# Patient Record
Sex: MALE | Race: WHITE | NOT HISPANIC OR LATINO | Employment: STUDENT | URBAN - METROPOLITAN AREA
[De-identification: names, ages, dates, MRNs, and addresses within clinical notes are randomized per-mention and may not be internally consistent; named-entity substitution may affect disease eponyms.]

---

## 2020-04-27 ENCOUNTER — ANESTHESIA (OUTPATIENT)
Dept: PERIOP | Facility: HOSPITAL | Age: 25
End: 2020-04-27

## 2020-04-27 ENCOUNTER — HOSPITAL ENCOUNTER (OUTPATIENT)
Facility: HOSPITAL | Age: 25
Discharge: HOME | End: 2020-04-28
Attending: EMERGENCY MEDICINE | Admitting: SURGERY

## 2020-04-27 ENCOUNTER — APPOINTMENT (OUTPATIENT)
Dept: CT IMAGING | Facility: HOSPITAL | Age: 25
End: 2020-04-27

## 2020-04-27 ENCOUNTER — ANESTHESIA EVENT (OUTPATIENT)
Dept: PERIOP | Facility: HOSPITAL | Age: 25
End: 2020-04-27

## 2020-04-27 DIAGNOSIS — R10.31 RIGHT LOWER QUADRANT ABDOMINAL PAIN: ICD-10-CM

## 2020-04-27 DIAGNOSIS — K35.80 ACUTE APPENDICITIS: ICD-10-CM

## 2020-04-27 DIAGNOSIS — K35.30 ACUTE APPENDICITIS WITH LOCALIZED PERITONITIS, WITHOUT PERFORATION, ABSCESS, OR GANGRENE: ICD-10-CM

## 2020-04-27 LAB
ALBUMIN SERPL-MCNC: 4.8 G/DL (ref 3.5–5.2)
ALBUMIN/GLOB SERPL: 1.5 G/DL
ALP SERPL-CCNC: 52 U/L (ref 39–117)
ALT SERPL W P-5'-P-CCNC: 6 U/L (ref 1–41)
ANION GAP SERPL CALCULATED.3IONS-SCNC: 14 MMOL/L (ref 5–15)
AST SERPL-CCNC: 12 U/L (ref 1–40)
BACTERIA UR QL AUTO: ABNORMAL /HPF
BASOPHILS # BLD AUTO: 0.04 10*3/MM3 (ref 0–0.2)
BASOPHILS NFR BLD AUTO: 0.5 % (ref 0–1.5)
BILIRUB SERPL-MCNC: 1.1 MG/DL (ref 0.2–1.2)
BILIRUB UR QL STRIP: ABNORMAL
BUN BLD-MCNC: 13 MG/DL (ref 6–20)
BUN/CREAT SERPL: 13.1 (ref 7–25)
CALCIUM SPEC-SCNC: 9.6 MG/DL (ref 8.6–10.5)
CHLORIDE SERPL-SCNC: 101 MMOL/L (ref 98–107)
CLARITY UR: ABNORMAL
CO2 SERPL-SCNC: 27 MMOL/L (ref 22–29)
COLOR UR: ABNORMAL
CREAT BLD-MCNC: 0.99 MG/DL (ref 0.76–1.27)
DEPRECATED RDW RBC AUTO: 38.2 FL (ref 37–54)
EOSINOPHIL # BLD AUTO: 0.06 10*3/MM3 (ref 0–0.4)
EOSINOPHIL NFR BLD AUTO: 0.7 % (ref 0.3–6.2)
ERYTHROCYTE [DISTWIDTH] IN BLOOD BY AUTOMATED COUNT: 11.3 % (ref 12.3–15.4)
GFR SERPL CREATININE-BSD FRML MDRD: 93 ML/MIN/1.73
GLOBULIN UR ELPH-MCNC: 3.1 GM/DL
GLUCOSE BLD-MCNC: 161 MG/DL (ref 65–99)
GLUCOSE UR STRIP-MCNC: NEGATIVE MG/DL
HCT VFR BLD AUTO: 44.8 % (ref 37.5–51)
HGB BLD-MCNC: 15.6 G/DL (ref 13–17.7)
HGB UR QL STRIP.AUTO: ABNORMAL
HOLD SPECIMEN: NORMAL
HOLD SPECIMEN: NORMAL
HYALINE CASTS UR QL AUTO: ABNORMAL /LPF
IMM GRANULOCYTES # BLD AUTO: 0.01 10*3/MM3 (ref 0–0.05)
IMM GRANULOCYTES NFR BLD AUTO: 0.1 % (ref 0–0.5)
KETONES UR QL STRIP: ABNORMAL
LEUKOCYTE ESTERASE UR QL STRIP.AUTO: ABNORMAL
LIPASE SERPL-CCNC: 20 U/L (ref 13–60)
LYMPHOCYTES # BLD AUTO: 1.72 10*3/MM3 (ref 0.7–3.1)
LYMPHOCYTES NFR BLD AUTO: 21 % (ref 19.6–45.3)
MCH RBC QN AUTO: 32 PG (ref 26.6–33)
MCHC RBC AUTO-ENTMCNC: 34.8 G/DL (ref 31.5–35.7)
MCV RBC AUTO: 91.8 FL (ref 79–97)
MONOCYTES # BLD AUTO: 0.86 10*3/MM3 (ref 0.1–0.9)
MONOCYTES NFR BLD AUTO: 10.5 % (ref 5–12)
MUCOUS THREADS URNS QL MICRO: ABNORMAL /HPF
NEUTROPHILS # BLD AUTO: 5.51 10*3/MM3 (ref 1.7–7)
NEUTROPHILS NFR BLD AUTO: 67.2 % (ref 42.7–76)
NITRITE UR QL STRIP: NEGATIVE
NRBC BLD AUTO-RTO: 0 /100 WBC (ref 0–0.2)
PH UR STRIP.AUTO: <=5 [PH] (ref 5–8)
PLATELET # BLD AUTO: 235 10*3/MM3 (ref 140–450)
PMV BLD AUTO: 11.2 FL (ref 6–12)
POTASSIUM BLD-SCNC: 4.1 MMOL/L (ref 3.5–5.2)
PROT SERPL-MCNC: 7.9 G/DL (ref 6–8.5)
PROT UR QL STRIP: ABNORMAL
RBC # BLD AUTO: 4.88 10*6/MM3 (ref 4.14–5.8)
RBC # UR: ABNORMAL /HPF
REF LAB TEST METHOD: ABNORMAL
SODIUM BLD-SCNC: 142 MMOL/L (ref 136–145)
SP GR UR STRIP: 1.03 (ref 1–1.03)
SQUAMOUS #/AREA URNS HPF: ABNORMAL /HPF
UROBILINOGEN UR QL STRIP: ABNORMAL
WBC NRBC COR # BLD: 8.2 10*3/MM3 (ref 3.4–10.8)
WBC UR QL AUTO: ABNORMAL /HPF
WHOLE BLOOD HOLD SPECIMEN: NORMAL
WHOLE BLOOD HOLD SPECIMEN: NORMAL

## 2020-04-27 PROCEDURE — 0 DIATRIZOATE MEGLUMINE & SODIUM PER 1 ML: Performed by: PHYSICIAN ASSISTANT

## 2020-04-27 PROCEDURE — 25010000002 PIPERACILLIN SOD-TAZOBACTAM PER 1 G: Performed by: PHYSICIAN ASSISTANT

## 2020-04-27 PROCEDURE — 25010000002 PROPOFOL 10 MG/ML EMULSION: Performed by: NURSE ANESTHETIST, CERTIFIED REGISTERED

## 2020-04-27 PROCEDURE — 74177 CT ABD & PELVIS W/CONTRAST: CPT

## 2020-04-27 PROCEDURE — 25010000002 NEOSTIGMINE 10 MG/10ML SOLUTION: Performed by: NURSE ANESTHETIST, CERTIFIED REGISTERED

## 2020-04-27 PROCEDURE — 81001 URINALYSIS AUTO W/SCOPE: CPT | Performed by: EMERGENCY MEDICINE

## 2020-04-27 PROCEDURE — 25010000002 SUCCINYLCHOLINE PER 20 MG: Performed by: NURSE ANESTHETIST, CERTIFIED REGISTERED

## 2020-04-27 PROCEDURE — 25010000002 MORPHINE PER 10 MG: Performed by: SURGERY

## 2020-04-27 PROCEDURE — G0378 HOSPITAL OBSERVATION PER HR: HCPCS

## 2020-04-27 PROCEDURE — 25010000002 FENTANYL CITRATE (PF) 100 MCG/2ML SOLUTION: Performed by: NURSE ANESTHETIST, CERTIFIED REGISTERED

## 2020-04-27 PROCEDURE — 88304 TISSUE EXAM BY PATHOLOGIST: CPT | Performed by: SURGERY

## 2020-04-27 PROCEDURE — 80053 COMPREHEN METABOLIC PANEL: CPT | Performed by: EMERGENCY MEDICINE

## 2020-04-27 PROCEDURE — 25010000002 HYDROMORPHONE 1 MG/ML SOLUTION: Performed by: NURSE ANESTHETIST, CERTIFIED REGISTERED

## 2020-04-27 PROCEDURE — 83690 ASSAY OF LIPASE: CPT | Performed by: EMERGENCY MEDICINE

## 2020-04-27 PROCEDURE — 99284 EMERGENCY DEPT VISIT MOD MDM: CPT

## 2020-04-27 PROCEDURE — 25010000002 ONDANSETRON PER 1 MG: Performed by: NURSE ANESTHETIST, CERTIFIED REGISTERED

## 2020-04-27 PROCEDURE — 25010000002 PIPERACILLIN SOD-TAZOBACTAM PER 1 G: Performed by: SURGERY

## 2020-04-27 PROCEDURE — 85025 COMPLETE CBC W/AUTO DIFF WBC: CPT | Performed by: EMERGENCY MEDICINE

## 2020-04-27 PROCEDURE — 25010000002 IOPAMIDOL 61 % SOLUTION: Performed by: EMERGENCY MEDICINE

## 2020-04-27 PROCEDURE — 25010000002 MIDAZOLAM PER 1 MG: Performed by: NURSE ANESTHETIST, CERTIFIED REGISTERED

## 2020-04-27 PROCEDURE — 25010000002 DEXAMETHASONE PER 1 MG: Performed by: NURSE ANESTHETIST, CERTIFIED REGISTERED

## 2020-04-27 DEVICE — ENDOPATH ECHELON VASCULAR  RELOADS, WHITE, 35MM
Type: IMPLANTABLE DEVICE | Site: ASCENDING COLON | Status: FUNCTIONAL
Brand: ECHELON ENDOPATH

## 2020-04-27 RX ORDER — FENTANYL CITRATE 50 UG/ML
INJECTION, SOLUTION INTRAMUSCULAR; INTRAVENOUS AS NEEDED
Status: DISCONTINUED | OUTPATIENT
Start: 2020-04-27 | End: 2020-04-27 | Stop reason: SURG

## 2020-04-27 RX ORDER — LIDOCAINE HYDROCHLORIDE 10 MG/ML
INJECTION, SOLUTION EPIDURAL; INFILTRATION; INTRACAUDAL; PERINEURAL AS NEEDED
Status: DISCONTINUED | OUTPATIENT
Start: 2020-04-27 | End: 2020-04-27 | Stop reason: SURG

## 2020-04-27 RX ORDER — LIDOCAINE HYDROCHLORIDE 10 MG/ML
0.5 INJECTION, SOLUTION EPIDURAL; INFILTRATION; INTRACAUDAL; PERINEURAL ONCE AS NEEDED
Status: CANCELLED | OUTPATIENT
Start: 2020-04-27

## 2020-04-27 RX ORDER — SUCCINYLCHOLINE CHLORIDE 20 MG/ML
INJECTION INTRAMUSCULAR; INTRAVENOUS AS NEEDED
Status: DISCONTINUED | OUTPATIENT
Start: 2020-04-27 | End: 2020-04-27 | Stop reason: SURG

## 2020-04-27 RX ORDER — CETIRIZINE HYDROCHLORIDE 5 MG/1
5 TABLET ORAL DAILY PRN
COMMUNITY

## 2020-04-27 RX ORDER — PROMETHAZINE HYDROCHLORIDE 25 MG/ML
12.5 INJECTION, SOLUTION INTRAMUSCULAR; INTRAVENOUS EVERY 6 HOURS PRN
Status: DISCONTINUED | OUTPATIENT
Start: 2020-04-27 | End: 2020-04-28 | Stop reason: HOSPADM

## 2020-04-27 RX ORDER — HYDROMORPHONE HYDROCHLORIDE 1 MG/ML
0.5 INJECTION, SOLUTION INTRAMUSCULAR; INTRAVENOUS; SUBCUTANEOUS
Status: DISCONTINUED | OUTPATIENT
Start: 2020-04-27 | End: 2020-04-27 | Stop reason: HOSPADM

## 2020-04-27 RX ORDER — DEXAMETHASONE SODIUM PHOSPHATE 10 MG/ML
INJECTION INTRAMUSCULAR; INTRAVENOUS AS NEEDED
Status: DISCONTINUED | OUTPATIENT
Start: 2020-04-27 | End: 2020-04-27 | Stop reason: SURG

## 2020-04-27 RX ORDER — FAMOTIDINE 10 MG/ML
20 INJECTION, SOLUTION INTRAVENOUS ONCE
Status: COMPLETED | OUTPATIENT
Start: 2020-04-27 | End: 2020-04-27

## 2020-04-27 RX ORDER — HYDROCODONE BITARTRATE AND ACETAMINOPHEN 5; 325 MG/1; MG/1
1 TABLET ORAL EVERY 4 HOURS PRN
Status: DISCONTINUED | OUTPATIENT
Start: 2020-04-27 | End: 2020-04-28 | Stop reason: HOSPADM

## 2020-04-27 RX ORDER — SODIUM CHLORIDE, SODIUM LACTATE, POTASSIUM CHLORIDE, CALCIUM CHLORIDE 600; 310; 30; 20 MG/100ML; MG/100ML; MG/100ML; MG/100ML
125 INJECTION, SOLUTION INTRAVENOUS CONTINUOUS
Status: DISCONTINUED | OUTPATIENT
Start: 2020-04-27 | End: 2020-04-28 | Stop reason: HOSPADM

## 2020-04-27 RX ORDER — MIDAZOLAM HYDROCHLORIDE 1 MG/ML
INJECTION INTRAMUSCULAR; INTRAVENOUS AS NEEDED
Status: DISCONTINUED | OUTPATIENT
Start: 2020-04-27 | End: 2020-04-27 | Stop reason: SURG

## 2020-04-27 RX ORDER — SODIUM CHLORIDE 0.9 % (FLUSH) 0.9 %
10 SYRINGE (ML) INJECTION EVERY 12 HOURS SCHEDULED
Status: DISCONTINUED | OUTPATIENT
Start: 2020-04-27 | End: 2020-04-27 | Stop reason: HOSPADM

## 2020-04-27 RX ORDER — SODIUM CHLORIDE 0.9 % (FLUSH) 0.9 %
10 SYRINGE (ML) INJECTION AS NEEDED
Status: DISCONTINUED | OUTPATIENT
Start: 2020-04-27 | End: 2020-04-28 | Stop reason: HOSPADM

## 2020-04-27 RX ORDER — PANTOPRAZOLE SODIUM 20 MG/1
20 TABLET, DELAYED RELEASE ORAL DAILY
COMMUNITY
End: 2022-03-03

## 2020-04-27 RX ORDER — FENTANYL CITRATE 50 UG/ML
50 INJECTION, SOLUTION INTRAMUSCULAR; INTRAVENOUS
Status: DISCONTINUED | OUTPATIENT
Start: 2020-04-27 | End: 2020-04-27 | Stop reason: HOSPADM

## 2020-04-27 RX ORDER — NEOSTIGMINE METHYLSULFATE 1 MG/ML
INJECTION, SOLUTION INTRAVENOUS AS NEEDED
Status: DISCONTINUED | OUTPATIENT
Start: 2020-04-27 | End: 2020-04-27 | Stop reason: SURG

## 2020-04-27 RX ORDER — SODIUM CHLORIDE, SODIUM LACTATE, POTASSIUM CHLORIDE, CALCIUM CHLORIDE 600; 310; 30; 20 MG/100ML; MG/100ML; MG/100ML; MG/100ML
9 INJECTION, SOLUTION INTRAVENOUS CONTINUOUS
Status: DISCONTINUED | OUTPATIENT
Start: 2020-04-27 | End: 2020-04-28

## 2020-04-27 RX ORDER — PROMETHAZINE HYDROCHLORIDE 25 MG/1
25 TABLET ORAL ONCE AS NEEDED
Status: DISCONTINUED | OUTPATIENT
Start: 2020-04-27 | End: 2020-04-27 | Stop reason: HOSPADM

## 2020-04-27 RX ORDER — ONDANSETRON 2 MG/ML
4 INJECTION INTRAMUSCULAR; INTRAVENOUS EVERY 6 HOURS PRN
Status: DISCONTINUED | OUTPATIENT
Start: 2020-04-27 | End: 2020-04-28 | Stop reason: HOSPADM

## 2020-04-27 RX ORDER — PROMETHAZINE HYDROCHLORIDE 25 MG/1
25 SUPPOSITORY RECTAL ONCE AS NEEDED
Status: DISCONTINUED | OUTPATIENT
Start: 2020-04-27 | End: 2020-04-27 | Stop reason: HOSPADM

## 2020-04-27 RX ORDER — ACETAMINOPHEN 325 MG/1
650 TABLET ORAL EVERY 4 HOURS PRN
Status: DISCONTINUED | OUTPATIENT
Start: 2020-04-27 | End: 2020-04-28 | Stop reason: HOSPADM

## 2020-04-27 RX ORDER — OXYCODONE AND ACETAMINOPHEN 10; 325 MG/1; MG/1
1 TABLET ORAL ONCE AS NEEDED
Status: DISCONTINUED | OUTPATIENT
Start: 2020-04-27 | End: 2020-04-27 | Stop reason: HOSPADM

## 2020-04-27 RX ORDER — SODIUM CHLORIDE 0.9 % (FLUSH) 0.9 %
10 SYRINGE (ML) INJECTION AS NEEDED
Status: DISCONTINUED | OUTPATIENT
Start: 2020-04-27 | End: 2020-04-27 | Stop reason: HOSPADM

## 2020-04-27 RX ORDER — ONDANSETRON 4 MG/1
4 TABLET, FILM COATED ORAL EVERY 6 HOURS PRN
Status: DISCONTINUED | OUTPATIENT
Start: 2020-04-27 | End: 2020-04-28 | Stop reason: HOSPADM

## 2020-04-27 RX ORDER — PROPOFOL 10 MG/ML
VIAL (ML) INTRAVENOUS AS NEEDED
Status: DISCONTINUED | OUTPATIENT
Start: 2020-04-27 | End: 2020-04-27 | Stop reason: SURG

## 2020-04-27 RX ORDER — FAMOTIDINE 20 MG/1
20 TABLET, FILM COATED ORAL ONCE
Status: CANCELLED | OUTPATIENT
Start: 2020-04-27 | End: 2020-04-27

## 2020-04-27 RX ORDER — MORPHINE SULFATE 4 MG/ML
4 INJECTION, SOLUTION INTRAMUSCULAR; INTRAVENOUS
Status: DISCONTINUED | OUTPATIENT
Start: 2020-04-27 | End: 2020-04-28 | Stop reason: HOSPADM

## 2020-04-27 RX ORDER — DOCUSATE SODIUM 100 MG/1
100 CAPSULE, LIQUID FILLED ORAL 2 TIMES DAILY
Status: DISCONTINUED | OUTPATIENT
Start: 2020-04-27 | End: 2020-04-28 | Stop reason: HOSPADM

## 2020-04-27 RX ORDER — ONDANSETRON 2 MG/ML
INJECTION INTRAMUSCULAR; INTRAVENOUS AS NEEDED
Status: DISCONTINUED | OUTPATIENT
Start: 2020-04-27 | End: 2020-04-27 | Stop reason: SURG

## 2020-04-27 RX ORDER — CELECOXIB 200 MG/1
200 CAPSULE ORAL EVERY 12 HOURS SCHEDULED
Status: DISCONTINUED | OUTPATIENT
Start: 2020-04-27 | End: 2020-04-28 | Stop reason: HOSPADM

## 2020-04-27 RX ORDER — PROMETHAZINE HYDROCHLORIDE 25 MG/ML
12.5 INJECTION, SOLUTION INTRAMUSCULAR; INTRAVENOUS ONCE AS NEEDED
Status: DISCONTINUED | OUTPATIENT
Start: 2020-04-27 | End: 2020-04-27 | Stop reason: HOSPADM

## 2020-04-27 RX ORDER — BUPIVACAINE HYDROCHLORIDE 5 MG/ML
INJECTION, SOLUTION PERINEURAL AS NEEDED
Status: DISCONTINUED | OUTPATIENT
Start: 2020-04-27 | End: 2020-04-27 | Stop reason: HOSPADM

## 2020-04-27 RX ORDER — NALOXONE HCL 0.4 MG/ML
0.4 VIAL (ML) INJECTION
Status: DISCONTINUED | OUTPATIENT
Start: 2020-04-27 | End: 2020-04-28 | Stop reason: HOSPADM

## 2020-04-27 RX ORDER — ROCURONIUM BROMIDE 10 MG/ML
INJECTION, SOLUTION INTRAVENOUS AS NEEDED
Status: DISCONTINUED | OUTPATIENT
Start: 2020-04-27 | End: 2020-04-27 | Stop reason: SURG

## 2020-04-27 RX ORDER — ONDANSETRON 2 MG/ML
4 INJECTION INTRAMUSCULAR; INTRAVENOUS ONCE AS NEEDED
Status: DISCONTINUED | OUTPATIENT
Start: 2020-04-27 | End: 2020-04-27 | Stop reason: HOSPADM

## 2020-04-27 RX ORDER — PANTOPRAZOLE SODIUM 40 MG/1
40 TABLET, DELAYED RELEASE ORAL DAILY
Status: DISCONTINUED | OUTPATIENT
Start: 2020-04-27 | End: 2020-04-28 | Stop reason: HOSPADM

## 2020-04-27 RX ORDER — GLYCOPYRROLATE 0.2 MG/ML
INJECTION INTRAMUSCULAR; INTRAVENOUS AS NEEDED
Status: DISCONTINUED | OUTPATIENT
Start: 2020-04-27 | End: 2020-04-27 | Stop reason: SURG

## 2020-04-27 RX ORDER — HEPARIN SODIUM 5000 [USP'U]/ML
5000 INJECTION, SOLUTION INTRAVENOUS; SUBCUTANEOUS EVERY 8 HOURS SCHEDULED
Status: DISCONTINUED | OUTPATIENT
Start: 2020-04-28 | End: 2020-04-28 | Stop reason: HOSPADM

## 2020-04-27 RX ADMIN — HYDROCODONE BITARTRATE AND ACETAMINOPHEN 1 TABLET: 5; 325 TABLET ORAL at 19:56

## 2020-04-27 RX ADMIN — Medication 15 ML: at 11:28

## 2020-04-27 RX ADMIN — LIDOCAINE HYDROCHLORIDE 50 MG: 10 INJECTION, SOLUTION EPIDURAL; INFILTRATION; INTRACAUDAL; PERINEURAL at 14:32

## 2020-04-27 RX ADMIN — DEXAMETHASONE SODIUM PHOSPHATE 8 MG: 10 INJECTION INTRAMUSCULAR; INTRAVENOUS at 14:39

## 2020-04-27 RX ADMIN — GLYCOPYRROLATE 0.4 MG: 0.2 INJECTION INTRAMUSCULAR; INTRAVENOUS at 15:08

## 2020-04-27 RX ADMIN — FAMOTIDINE 20 MG: 10 INJECTION INTRAVENOUS at 14:04

## 2020-04-27 RX ADMIN — HYDROMORPHONE HYDROCHLORIDE 0.5 MG: 1 INJECTION, SOLUTION INTRAMUSCULAR; INTRAVENOUS; SUBCUTANEOUS at 15:05

## 2020-04-27 RX ADMIN — ONDANSETRON 4 MG: 2 INJECTION INTRAMUSCULAR; INTRAVENOUS at 15:08

## 2020-04-27 RX ADMIN — CELECOXIB 200 MG: 200 CAPSULE ORAL at 21:34

## 2020-04-27 RX ADMIN — SODIUM CHLORIDE, POTASSIUM CHLORIDE, SODIUM LACTATE AND CALCIUM CHLORIDE 9 ML/HR: 600; 310; 30; 20 INJECTION, SOLUTION INTRAVENOUS at 14:04

## 2020-04-27 RX ADMIN — TAZOBACTAM SODIUM AND PIPERACILLIN SODIUM 3.38 G: 375; 3 INJECTION, SOLUTION INTRAVENOUS at 21:34

## 2020-04-27 RX ADMIN — NEOSTIGMINE 3 MG: 1 INJECTION INTRAVENOUS at 15:08

## 2020-04-27 RX ADMIN — TAZOBACTAM SODIUM AND PIPERACILLIN SODIUM 3.38 G: 375; 3 INJECTION, SOLUTION INTRAVENOUS at 14:26

## 2020-04-27 RX ADMIN — MIDAZOLAM 2 MG: 1 INJECTION INTRAMUSCULAR; INTRAVENOUS at 14:24

## 2020-04-27 RX ADMIN — IOPAMIDOL 100 ML: 612 INJECTION, SOLUTION INTRAVENOUS at 12:39

## 2020-04-27 RX ADMIN — ROCURONIUM BROMIDE 40 MG: 10 INJECTION INTRAVENOUS at 14:32

## 2020-04-27 RX ADMIN — SUCCINYLCHOLINE CHLORIDE 100 MG: 20 INJECTION, SOLUTION INTRAMUSCULAR; INTRAVENOUS; PARENTERAL at 14:32

## 2020-04-27 RX ADMIN — MORPHINE SULFATE 4 MG: 4 INJECTION, SOLUTION INTRAMUSCULAR; INTRAVENOUS at 17:11

## 2020-04-27 RX ADMIN — HYDROMORPHONE HYDROCHLORIDE 0.5 MG: 1 INJECTION, SOLUTION INTRAMUSCULAR; INTRAVENOUS; SUBCUTANEOUS at 15:32

## 2020-04-27 RX ADMIN — DOCUSATE SODIUM 100 MG: 100 CAPSULE, LIQUID FILLED ORAL at 21:34

## 2020-04-27 RX ADMIN — FENTANYL CITRATE 100 MCG: 50 INJECTION, SOLUTION INTRAMUSCULAR; INTRAVENOUS at 14:32

## 2020-04-27 RX ADMIN — SODIUM CHLORIDE, POTASSIUM CHLORIDE, SODIUM LACTATE AND CALCIUM CHLORIDE 125 ML/HR: 600; 310; 30; 20 INJECTION, SOLUTION INTRAVENOUS at 16:35

## 2020-04-27 RX ADMIN — PROPOFOL 200 MG: 10 INJECTION, EMULSION INTRAVENOUS at 14:32

## 2020-04-27 RX ADMIN — SODIUM CHLORIDE 1000 ML: 9 INJECTION, SOLUTION INTRAVENOUS at 11:28

## 2020-04-28 VITALS
HEART RATE: 67 BPM | WEIGHT: 200 LBS | OXYGEN SATURATION: 95 % | SYSTOLIC BLOOD PRESSURE: 109 MMHG | TEMPERATURE: 98 F | HEIGHT: 78 IN | BODY MASS INDEX: 23.14 KG/M2 | DIASTOLIC BLOOD PRESSURE: 56 MMHG | RESPIRATION RATE: 18 BRPM

## 2020-04-28 PROBLEM — K35.80 ACUTE APPENDICITIS: Status: RESOLVED | Noted: 2020-04-27 | Resolved: 2020-04-28

## 2020-04-28 PROBLEM — K35.30 ACUTE APPENDICITIS WITH LOCALIZED PERITONITIS, WITHOUT PERFORATION, ABSCESS, OR GANGRENE: Status: RESOLVED | Noted: 2020-04-27 | Resolved: 2020-04-28

## 2020-04-28 LAB
BASOPHILS # BLD AUTO: 0.01 10*3/MM3 (ref 0–0.2)
BASOPHILS NFR BLD AUTO: 0.1 % (ref 0–1.5)
DEPRECATED RDW RBC AUTO: 37.2 FL (ref 37–54)
EOSINOPHIL # BLD AUTO: 0 10*3/MM3 (ref 0–0.4)
EOSINOPHIL NFR BLD AUTO: 0 % (ref 0.3–6.2)
ERYTHROCYTE [DISTWIDTH] IN BLOOD BY AUTOMATED COUNT: 11 % (ref 12.3–15.4)
HCT VFR BLD AUTO: 38.4 % (ref 37.5–51)
HGB BLD-MCNC: 13.1 G/DL (ref 13–17.7)
IMM GRANULOCYTES # BLD AUTO: 0.05 10*3/MM3 (ref 0–0.05)
IMM GRANULOCYTES NFR BLD AUTO: 0.4 % (ref 0–0.5)
LYMPHOCYTES # BLD AUTO: 1.37 10*3/MM3 (ref 0.7–3.1)
LYMPHOCYTES NFR BLD AUTO: 11.1 % (ref 19.6–45.3)
MCH RBC QN AUTO: 31.4 PG (ref 26.6–33)
MCHC RBC AUTO-ENTMCNC: 34.1 G/DL (ref 31.5–35.7)
MCV RBC AUTO: 92.1 FL (ref 79–97)
MONOCYTES # BLD AUTO: 1.1 10*3/MM3 (ref 0.1–0.9)
MONOCYTES NFR BLD AUTO: 8.9 % (ref 5–12)
NEUTROPHILS # BLD AUTO: 9.82 10*3/MM3 (ref 1.7–7)
NEUTROPHILS NFR BLD AUTO: 79.5 % (ref 42.7–76)
NRBC BLD AUTO-RTO: 0 /100 WBC (ref 0–0.2)
PLATELET # BLD AUTO: 218 10*3/MM3 (ref 140–450)
PMV BLD AUTO: 11.1 FL (ref 6–12)
RBC # BLD AUTO: 4.17 10*6/MM3 (ref 4.14–5.8)
WBC NRBC COR # BLD: 12.35 10*3/MM3 (ref 3.4–10.8)

## 2020-04-28 PROCEDURE — G0378 HOSPITAL OBSERVATION PER HR: HCPCS

## 2020-04-28 PROCEDURE — 25010000002 PIPERACILLIN SOD-TAZOBACTAM PER 1 G: Performed by: SURGERY

## 2020-04-28 PROCEDURE — 25010000002 HEPARIN (PORCINE) PER 1000 UNITS: Performed by: SURGERY

## 2020-04-28 PROCEDURE — 85025 COMPLETE CBC W/AUTO DIFF WBC: CPT | Performed by: SURGERY

## 2020-04-28 RX ORDER — HYDROCODONE BITARTRATE AND ACETAMINOPHEN 5; 325 MG/1; MG/1
1 TABLET ORAL EVERY 6 HOURS PRN
Qty: 10 TABLET | Refills: 0 | Status: SHIPPED | OUTPATIENT
Start: 2020-04-28 | End: 2022-03-03

## 2020-04-28 RX ADMIN — HEPARIN SODIUM 5000 UNITS: 5000 INJECTION, SOLUTION INTRAVENOUS; SUBCUTANEOUS at 05:09

## 2020-04-28 RX ADMIN — PANTOPRAZOLE SODIUM 40 MG: 40 TABLET, DELAYED RELEASE ORAL at 08:31

## 2020-04-28 RX ADMIN — HYDROCODONE BITARTRATE AND ACETAMINOPHEN 1 TABLET: 5; 325 TABLET ORAL at 05:09

## 2020-04-28 RX ADMIN — CELECOXIB 200 MG: 200 CAPSULE ORAL at 08:31

## 2020-04-28 RX ADMIN — TAZOBACTAM SODIUM AND PIPERACILLIN SODIUM 3.38 G: 375; 3 INJECTION, SOLUTION INTRAVENOUS at 05:09

## 2020-04-28 RX ADMIN — DOCUSATE SODIUM 100 MG: 100 CAPSULE, LIQUID FILLED ORAL at 08:31

## 2020-04-29 LAB
CYTO UR: NORMAL
LAB AP CASE REPORT: NORMAL
LAB AP CLINICAL INFORMATION: NORMAL
LAB AP DIAGNOSIS COMMENT: NORMAL
PATH REPORT.FINAL DX SPEC: NORMAL
PATH REPORT.GROSS SPEC: NORMAL

## 2022-03-03 ENCOUNTER — APPOINTMENT (OUTPATIENT)
Dept: DIABETES SERVICES | Facility: HOSPITAL | Age: 27
End: 2022-03-03

## 2022-03-03 ENCOUNTER — OFFICE VISIT (OUTPATIENT)
Dept: ENDOCRINOLOGY | Facility: CLINIC | Age: 27
End: 2022-03-03

## 2022-03-03 ENCOUNTER — OFFICE VISIT (OUTPATIENT)
Dept: INTERNAL MEDICINE | Facility: CLINIC | Age: 27
End: 2022-03-03

## 2022-03-03 VITALS
SYSTOLIC BLOOD PRESSURE: 110 MMHG | BODY MASS INDEX: 19.44 KG/M2 | HEIGHT: 78 IN | HEART RATE: 89 BPM | TEMPERATURE: 98.1 F | WEIGHT: 168 LBS | DIASTOLIC BLOOD PRESSURE: 72 MMHG | OXYGEN SATURATION: 96 %

## 2022-03-03 DIAGNOSIS — Z76.89 ENCOUNTER TO ESTABLISH CARE: ICD-10-CM

## 2022-03-03 DIAGNOSIS — Z13.1 DIABETES MELLITUS SCREENING: ICD-10-CM

## 2022-03-03 DIAGNOSIS — E10.9 TYPE 1 DIABETES MELLITUS WITHOUT COMPLICATION: Primary | ICD-10-CM

## 2022-03-03 LAB
EXPIRATION DATE: ABNORMAL
GLUCOSE BLDC GLUCOMTR-MCNC: 269 MG/DL (ref 70–130)
HBA1C MFR BLD: 13.3 %
Lab: ABNORMAL

## 2022-03-03 PROCEDURE — 82962 GLUCOSE BLOOD TEST: CPT

## 2022-03-03 PROCEDURE — 83036 HEMOGLOBIN GLYCOSYLATED A1C: CPT

## 2022-03-03 PROCEDURE — 99204 OFFICE O/P NEW MOD 45 MIN: CPT

## 2022-03-03 PROCEDURE — 99203 OFFICE O/P NEW LOW 30 MIN: CPT | Performed by: INTERNAL MEDICINE

## 2022-03-03 RX ORDER — BLOOD SUGAR DIAGNOSTIC
STRIP MISCELLANEOUS
Qty: 150 EACH | Refills: 5 | Status: SHIPPED | OUTPATIENT
Start: 2022-03-03

## 2022-03-03 RX ORDER — LANCETS 33 GAUGE
1 EACH MISCELLANEOUS 4 TIMES DAILY
Qty: 200 EACH | Refills: 5 | Status: SHIPPED | OUTPATIENT
Start: 2022-03-03

## 2022-03-03 NOTE — PROGRESS NOTES
Chief Complaint  Establish Care (Pt c/o weight loss, and excess thirst, in last few months. Family hx of DM, Type 1)    Kash Mullen presents to [facility]    Previous PCP: From [place], [place].     Currently in Med School at .     HPI: This is a very pleasant male patient who reports noticing an increase in thirst, urination, weight loss and intermittent calf pain x 2 months. Has never been dx with DM before but has a family history of type 1 DM. Symptoms are persistent but worsened after activity. Presenting today for labs and evaluation for possible DM dx. A1c today 13.3%.     Subjective         PMSFH  The following portions of the patient's history were reviewed and updated as appropriate: allergies, current medications, past family history, past medical history, past social history, past surgical history and problem list.     History reviewed. No pertinent past medical history.   No Known Allergies   Social History     Tobacco Use   • Smoking status: Never Smoker   • Smokeless tobacco: Never Used   Substance Use Topics   • Alcohol use: Yes     Comment: Occasionally   • Drug use: Never     Past Surgical History:   Procedure Laterality Date   • APPENDECTOMY N/A 4/27/2020    Procedure: APPENDECTOMY LAPAROSCOPIC;  Surgeon: Edson Bolden MD;  Location: Counts include 234 beds at the [facility];  Service: General;  Laterality: N/A;   • WISDOM TOOTH EXTRACTION        Family History   Problem Relation Age of Onset   • Hyperlipidemia Mother    • Heart disease Father    • Hypertension Father    • Diabetes Maternal Grandfather          Current Outpatient Medications:   •  cetirizine (zyrTEC) 5 MG tablet, Take 5 mg by mouth Daily As Needed for Allergies., Disp: , Rfl:   •  glucose blood (OneTouch Verio) test strip, Use for testing ac and hs 4 times daily, Disp: 150 each, Rfl: 5  •  OneTouch Delica Lancets 33G misc, 1 Units 4 (Four) Times a Day., Disp: 200 each, Rfl: 5    Review of Systems   Constitutional:  "Positive for fatigue. Negative for activity change, appetite change, chills and fever.   Eyes: Negative for visual disturbance.   Respiratory: Negative for apnea, cough, choking, chest tightness, shortness of breath, wheezing and stridor.    Cardiovascular: Negative for chest pain, palpitations and leg swelling.   Endocrine: Positive for polydipsia and polyuria. Negative for polyphagia.   Musculoskeletal: Positive for myalgias. Negative for arthralgias, back pain, gait problem, joint swelling, neck pain, neck stiffness and bursitis.   Skin: Negative for color change, dry skin, pallor, rash, skin lesions, wound and bruise.   Allergic/Immunologic: Negative for environmental allergies and food allergies.       Objective   Vital Signs  /72   Pulse 89   Temp 98.1 °F (36.7 °C)   Ht 198.1 cm (78\")   Wt 76.2 kg (168 lb)   SpO2 96%   BMI 19.41 kg/m²     Physical Exam  Constitutional:       Appearance: Normal appearance.   HENT:      Head: Normocephalic.      Mouth/Throat:      Mouth: Mucous membranes are dry.      Pharynx: Oropharynx is clear.   Eyes:      Extraocular Movements: Extraocular movements intact.      Pupils: Pupils are equal, round, and reactive to light.   Cardiovascular:      Rate and Rhythm: Normal rate and regular rhythm.      Pulses: Normal pulses.      Heart sounds: Normal heart sounds.   Pulmonary:      Effort: Pulmonary effort is normal.      Breath sounds: Normal breath sounds.   Skin:     General: Skin is warm and dry.      Capillary Refill: Capillary refill takes less than 2 seconds.   Neurological:      Mental Status: He is alert and oriented to person, place, and time.   Psychiatric:         Mood and Affect: Mood normal.         Behavior: Behavior normal.         Thought Content: Thought content normal.         Judgment: Judgment normal.          Result Review :     The following data was reviewed by: MARA Gonzalez on 03/03/2022:  Most Recent A1C    HGBA1C Most Recent 3/3/22 "   Hemoglobin A1C 13.3             Assessment and Plan    1. Diabetes mellitus screening  - POC Glycosylated Hemoglobin (Hb A1C)  - CBC & Differential; Future  - Comprehensive Metabolic Panel; Future  - TSH Rfx On Abnormal To Free T4; Future  - C-Peptide; Future  - Insulin, Free & Total, Serum; Future  - Beta-Hydroxybutyrate; Future  - POCT Glucose    2. Encounter to establish care  - Lipid Panel; Future    3. Type 1 diabetes mellitus without complication (HCC)  - Had a discussion with the patient about his A1c revealing very uncontrolled DM. Discussed that this was likely Type 1 DM and we could verify with labs. Patient is to begin his surgery rounds next week so I contacted Dr. Todd at  Endocrinology in hopes for expedited specialty care. Dr. Todd was very accommodating and willing to take on this patient for specialty care and was able to see the patient the same day. Will see the patient back in around 1 month for a full PE as endocrinology will take over his DM care.       I spent 45 minutes caring for Kash on this date of service. This time includes time spent by me in the following activities:preparing for the visit, reviewing tests, obtaining and/or reviewing a separately obtained history, performing a medically appropriate examination and/or evaluation , counseling and educating the patient/family/caregiver, ordering medications, tests, or procedures, referring and communicating with other health care professionals , documenting information in the medical record, independently interpreting results and communicating that information with the patient/family/caregiver and care coordination    Follow Up     Return in about 1 month (around 4/3/2022) for Next scheduled follow up.    Patient was given instructions and counseling regarding his condition or for health maintenance advice. Please see specific information pulled into the AVS if appropriate.    Part of this note may be an electronic  transcription/translation of spoken language to printed text using the Dragon Dictation System.    Electronically signed by:   MARA Gonzalez  03/03/2022

## 2022-03-04 ENCOUNTER — DOCUMENTATION (OUTPATIENT)
Dept: DIABETES SERVICES | Facility: HOSPITAL | Age: 27
End: 2022-03-04

## 2022-03-04 PROCEDURE — G0109 DIAB MANAGE TRN IND/GROUP: HCPCS | Performed by: REGISTERED NURSE

## 2022-03-04 NOTE — PLAN OF CARE
Urgent appointment completed yesterday at the request of provider with specific objectives for newly diagnosed hyperglycemia. Patient receptive to visit and problem focused visit with meter teach, injections, insulins, hypoglycemia and treatment. A donated one touch verio was used for education. He had recently stopped sugary beverages for lent. Encouraged three meals daily until can meet with RD for nutrition education. Thank you for this opportunity.

## 2022-03-07 ENCOUNTER — TELEPHONE (OUTPATIENT)
Dept: ENDOCRINOLOGY | Facility: CLINIC | Age: 27
End: 2022-03-07

## 2022-03-07 PROBLEM — E10.9 TYPE 1 DIABETES MELLITUS WITHOUT COMPLICATION: Status: ACTIVE | Noted: 2022-03-07

## 2022-03-07 RX ORDER — BLOOD SUGAR DIAGNOSTIC
STRIP MISCELLANEOUS
Qty: 150 EACH | Refills: 11 | Status: SHIPPED | OUTPATIENT
Start: 2022-03-07 | End: 2022-11-01 | Stop reason: SDUPTHER

## 2022-03-07 RX ORDER — PEN NEEDLE, DIABETIC 30 GX3/16"
1 NEEDLE, DISPOSABLE MISCELLANEOUS DAILY
Qty: 100 EACH | Refills: 11 | Status: SHIPPED | OUTPATIENT
Start: 2022-03-07 | End: 2022-03-21 | Stop reason: SDUPTHER

## 2022-03-07 RX ORDER — LANCETS
EACH MISCELLANEOUS
Qty: 150 EACH | Refills: 11 | Status: SHIPPED | OUTPATIENT
Start: 2022-03-07 | End: 2022-11-01 | Stop reason: ALTCHOICE

## 2022-03-07 NOTE — PROGRESS NOTES
Kash Mullen 27 y.o.  CC: Diabetes (New patient referred for evaluation of high blood sugar )      [place]: Diabetes (New patient referred for evaluation of high blood sugar )    New patient referred by MARA Agustin  Patient with h/o malaise since December  Has had weight loss, urination  Blood sugar and 90 day average reviewed  Results for orders placed or performed in visit on 03/03/22   POC Glycosylated Hemoglobin (Hb A1C)    Specimen: Blood   Result Value Ref Range    Hemoglobin A1C 13.3 %    Lot Number [id number]     Expiration Date 8/30/23    POCT Glucose    Specimen: Blood   Result Value Ref Range    Glucose 269 (A) 70 - 130 mg/dL     Has family h/o IDDM (grandfather)   Discussed blood sugar and average sugar (consistent with uncontrolled diabetes)  He had diabetes education   This included diet, carb counting, insulin administration and blood sugar testing  Goals for blood sugar reviewed  He was given a glucose meter and test strips   He is going to [place] this weekend for bachelor party     rNo Known Allergies    Current Outpatient Medications:   •  cetirizine (zyrTEC) 5 MG tablet, Take 5 mg by mouth Daily As Needed for Allergies., Disp: , Rfl:   •  glucose blood (OneTouch Verio) test strip, Use for testing ac and hs 4 times daily, Disp: 150 each, Rfl: 5  •  glucose blood (OneTouch Verio) test strip, TEST 4 TIMES DAILY, Disp: 150 each, Rfl: 11  •  Insulin Pen Needle (Pen Needles) 32G X 4 MM misc, 1 each Daily., Disp: 100 each, Rfl: 11  •  Lancets (onetouch ultrasoft) lancets, TEST 4 TIMES DAILY, Disp: 150 each, Rfl: 11  •  OneTouch Delica Lancets 33G misc, 1 Units 4 (Four) Times a Day., Disp: 200 each, Rfl: 5  Patient Active Problem List    Diagnosis    • Type 1 diabetes mellitus without complication (HCC) [E10.9]      Review of Systems   Constitutional: Positive for fatigue and unexpected weight change.   Cardiovascular: Positive for palpitations.   Endocrine: Positive for polydipsia and polyuria.    Genitourinary: Positive for frequency and urgency.   Neurological: Positive for light-headedness.     Social History     Socioeconomic History   • Marital status: Single   Tobacco Use   • Smoking status: Never Smoker   • Smokeless tobacco: Never Used   Substance and Sexual Activity   • Alcohol use: Yes     Comment: Occasionally   • Drug use: Never   • Sexual activity: Defer     Family History   Problem Relation Age of Onset   • Hyperlipidemia Mother    • Heart disease Father    • Hypertension Father    • Diabetes Maternal Grandfather      There were no vitals taken for this visit.  Physical Exam  Constitutional:       Appearance: He is ill-appearing.   HENT:      Head: Normocephalic.   Pulmonary:      Effort: Pulmonary effort is normal. No respiratory distress.   Neurological:      General: No focal deficit present.      Mental Status: He is alert.   Psychiatric:         Behavior: Behavior normal.         Thought Content: Thought content normal.         Judgment: Judgment normal.       Results for orders placed or performed in visit on 03/03/22   POC Glycosylated Hemoglobin (Hb A1C)    Specimen: Blood   Result Value Ref Range    Hemoglobin A1C 13.3 %    Lot Number [id number]     Expiration Date 8/30/23    POCT Glucose    Specimen: Blood   Result Value Ref Range    Glucose 269 (A) 70 - 130 mg/dL     Diagnoses and all orders for this visit:    1. Type 1 diabetes mellitus without complication (HCC) (Primary)  Assessment & Plan:  Blood sugar and 90 day average sugar reviewed  Results for orders placed or performed in visit on 03/03/22   POC Glycosylated Hemoglobin (Hb A1C)    Specimen: Blood   Result Value Ref Range    Hemoglobin A1C 13.3 %    Lot Number [id number]     Expiration Date 8/30/23    POCT Glucose    Specimen: Blood   Result Value Ref Range    Glucose 269 (A) 70 - 130 mg/dL     Start basal/bolus insulin 10 u toujeo / 4 u humalog  Meter and strips provided  He will report sugars daily for adjustment  F/u 3-4  weeks  Consider sensor   Check kiara/islet with next blood draw  Dosing and adjustment of insulin discussed and he received education today      Orders:  -     Ambulatory Referral to Diabetic Education  -     Anti-islet Cell Antibody; Future  -     Glutamic Acid Decarboxylase; Future    Other orders  -     glucose blood (OneTouch Verio) test strip; Use for testing ac and hs 4 times daily  Dispense: 150 each; Refill: 5  -     OneTouch Delica Lancets 33G misc; 1 Units 4 (Four) Times a Day.  Dispense: 200 each; Refill: 5  Return in about 4 weeks (around 3/31/2022) for Recheck.    Sara Todd MD  Signed Sara Todd MD

## 2022-03-08 NOTE — ASSESSMENT & PLAN NOTE
Blood sugar and 90 day average sugar reviewed  Results for orders placed or performed in visit on 03/03/22   POC Glycosylated Hemoglobin (Hb A1C)    Specimen: Blood   Result Value Ref Range    Hemoglobin A1C 13.3 %    Lot Number [id number]     Expiration Date 8/30/23    POCT Glucose    Specimen: Blood   Result Value Ref Range    Glucose 269 (A) 70 - 130 mg/dL     Start basal/bolus insulin 10 u toujeo / 4 u humalog  Meter and strips provided  He will report sugars daily for adjustment  F/u 3-4 weeks  Consider sensor   Check kiara/islet with next blood draw  Dosing and adjustment of insulin discussed and he received education today

## 2022-03-11 RX ORDER — INSULIN LISPRO 100 [IU]/ML
INJECTION, SOLUTION INTRAVENOUS; SUBCUTANEOUS
Qty: 15 ML | Refills: 5 | Status: SHIPPED | OUTPATIENT
Start: 2022-03-11 | End: 2022-07-05 | Stop reason: SDUPTHER

## 2022-03-11 RX ORDER — INSULIN GLARGINE 300 U/ML
22 INJECTION, SOLUTION SUBCUTANEOUS DAILY
Qty: 9 ML | Refills: 5 | Status: SHIPPED | OUTPATIENT
Start: 2022-03-11 | End: 2022-11-01 | Stop reason: ALTCHOICE

## 2022-03-21 ENCOUNTER — OFFICE VISIT (OUTPATIENT)
Dept: ENDOCRINOLOGY | Facility: CLINIC | Age: 27
End: 2022-03-21

## 2022-03-21 ENCOUNTER — LAB (OUTPATIENT)
Dept: LAB | Facility: HOSPITAL | Age: 27
End: 2022-03-21

## 2022-03-21 VITALS
HEIGHT: 78 IN | DIASTOLIC BLOOD PRESSURE: 60 MMHG | WEIGHT: 186.4 LBS | BODY MASS INDEX: 21.57 KG/M2 | OXYGEN SATURATION: 99 % | HEART RATE: 78 BPM | SYSTOLIC BLOOD PRESSURE: 105 MMHG

## 2022-03-21 DIAGNOSIS — E10.9 TYPE 1 DIABETES MELLITUS WITHOUT COMPLICATION: Primary | ICD-10-CM

## 2022-03-21 DIAGNOSIS — E10.9 TYPE 1 DIABETES MELLITUS WITHOUT COMPLICATION: ICD-10-CM

## 2022-03-21 LAB
ALBUMIN SERPL-MCNC: 4.6 G/DL (ref 3.5–5.2)
ALBUMIN/GLOB SERPL: 2.3 G/DL
ALP SERPL-CCNC: 55 U/L (ref 39–117)
ALT SERPL W P-5'-P-CCNC: 94 U/L (ref 1–41)
ANION GAP SERPL CALCULATED.3IONS-SCNC: 10.5 MMOL/L (ref 5–15)
AST SERPL-CCNC: 41 U/L (ref 1–40)
BILIRUB SERPL-MCNC: 0.7 MG/DL (ref 0–1.2)
BUN SERPL-MCNC: 12 MG/DL (ref 6–20)
BUN/CREAT SERPL: 12.1 (ref 7–25)
CALCIUM SPEC-SCNC: 9.4 MG/DL (ref 8.6–10.5)
CHLORIDE SERPL-SCNC: 103 MMOL/L (ref 98–107)
CHOLEST SERPL-MCNC: 201 MG/DL (ref 0–200)
CO2 SERPL-SCNC: 27.5 MMOL/L (ref 22–29)
CREAT SERPL-MCNC: 0.99 MG/DL (ref 0.76–1.27)
EGFRCR SERPLBLD CKD-EPI 2021: 107.7 ML/MIN/1.73
EXPIRATION DATE: NORMAL
EXPIRATION DATE: NORMAL
GLOBULIN UR ELPH-MCNC: 2 GM/DL
GLUCOSE BLDC GLUCOMTR-MCNC: 111 MG/DL (ref 70–130)
GLUCOSE SERPL-MCNC: 149 MG/DL (ref 65–99)
HBA1C MFR BLD: 9.7 %
HDLC SERPL-MCNC: 42 MG/DL (ref 40–60)
LDLC SERPL CALC-MCNC: 140 MG/DL (ref 0–100)
LDLC/HDLC SERPL: 3.3 {RATIO}
Lab: NORMAL
Lab: NORMAL
POTASSIUM SERPL-SCNC: 3.8 MMOL/L (ref 3.5–5.2)
PROT SERPL-MCNC: 6.6 G/DL (ref 6–8.5)
SODIUM SERPL-SCNC: 141 MMOL/L (ref 136–145)
TRIGL SERPL-MCNC: 102 MG/DL (ref 0–150)
TSH SERPL DL<=0.05 MIU/L-ACNC: 2.94 UIU/ML (ref 0.27–4.2)
VLDLC SERPL-MCNC: 19 MG/DL (ref 5–40)

## 2022-03-21 PROCEDURE — 80061 LIPID PANEL: CPT | Performed by: INTERNAL MEDICINE

## 2022-03-21 PROCEDURE — 86341 ISLET CELL ANTIBODY: CPT | Performed by: INTERNAL MEDICINE

## 2022-03-21 PROCEDURE — 84443 ASSAY THYROID STIM HORMONE: CPT | Performed by: INTERNAL MEDICINE

## 2022-03-21 PROCEDURE — 86341 ISLET CELL ANTIBODY: CPT

## 2022-03-21 PROCEDURE — 99213 OFFICE O/P EST LOW 20 MIN: CPT | Performed by: INTERNAL MEDICINE

## 2022-03-21 PROCEDURE — 80053 COMPREHEN METABOLIC PANEL: CPT | Performed by: INTERNAL MEDICINE

## 2022-03-21 PROCEDURE — 82947 ASSAY GLUCOSE BLOOD QUANT: CPT | Performed by: INTERNAL MEDICINE

## 2022-03-21 PROCEDURE — 3046F HEMOGLOBIN A1C LEVEL >9.0%: CPT | Performed by: INTERNAL MEDICINE

## 2022-03-21 PROCEDURE — 36415 COLL VENOUS BLD VENIPUNCTURE: CPT

## 2022-03-21 PROCEDURE — 83036 HEMOGLOBIN GLYCOSYLATED A1C: CPT | Performed by: INTERNAL MEDICINE

## 2022-03-21 PROCEDURE — 84681 ASSAY OF C-PEPTIDE: CPT | Performed by: INTERNAL MEDICINE

## 2022-03-21 RX ORDER — FLUTICASONE PROPIONATE 50 MCG
2 SPRAY, SUSPENSION (ML) NASAL DAILY
COMMUNITY

## 2022-03-21 RX ORDER — PEN NEEDLE, DIABETIC 30 GX3/16"
1 NEEDLE, DISPOSABLE MISCELLANEOUS
Qty: 200 EACH | Refills: 11 | Status: SHIPPED | OUTPATIENT
Start: 2022-03-21 | End: 2022-11-01 | Stop reason: ALTCHOICE

## 2022-03-21 RX ORDER — PEN NEEDLE, DIABETIC 31 GX5/16"
NEEDLE, DISPOSABLE MISCELLANEOUS
Qty: 200 EACH | Refills: 10 | Status: SHIPPED | OUTPATIENT
Start: 2022-03-21

## 2022-03-21 NOTE — ASSESSMENT & PLAN NOTE
Improved a1c using basal (toujeo) and bolus insulin   Sugars mostly under 200  Discussed lowering pre meal insulin   Goal fasting reviewed  Adjustment of medication and diet for exercise and alcohol discussed  Downloaded glucose meter  Trial vicki 2 sample provided along with pump information   Closed loop system discussed  He has had full education   Check kiara ab today   F/u 3 months

## 2022-03-21 NOTE — PROGRESS NOTES
Kash Mullen 27 y.o.  CC:Follow-up and Diabetes (Type I, eye exam December 2021 Dr Sorensen in [facility])      [place]: Follow-up and Diabetes (Type I, eye exam December 2021 Dr Sorensen in [facility])    Blood sugar and 90 day average sugar reviewed  Results for orders placed or performed in visit on 03/21/22   POC Glycosylated Hemoglobin (Hb A1C)    Specimen: Blood   Result Value Ref Range    Hemoglobin A1C 9.7 %    Lot Number [id number]     Expiration Date 11/15/2023    POC Glucose, Blood    Specimen: Blood   Result Value Ref Range    Glucose 111 70 - 130 mg/dL    Lot Number [id number]     Expiration Date 07/14/2022      Improved considerably from last ov   He is currently taking 26 u of toujeo daily   He is also taking 8 u humalog tid  toujeo is not covered by his insurance  Discussed continuing this in light of good results, 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  He is having occ low sugar mid morning / mid afternoon  Discussed reducing insulin prior to lighter meals to 6 units  Based on tdd discussed Insulin to carb ratio of 1:10 with correction 30   Goals for glucose prior to exercise discussed  Snacks and hypoglycemia prevention reviewed    No Known Allergies    Current Outpatient Medications:   •  Alcohol Swabs (Alcohol Prep) pads, Use 8-10 per day to test blood sugars and administer insulin, Disp: 200 each, Rfl: 10  •  cetirizine (zyrTEC) 5 MG tablet, Take 5 mg by mouth Daily As Needed for Allergies., Disp: , Rfl:   •  fluticasone (FLONASE) 50 MCG/ACT nasal spray, 2 sprays into the nostril(s) as directed by provider Daily., Disp: , Rfl:   •  glucose blood (OneTouch Verio) test strip, Use for testing ac and hs 4 times daily, Disp: 150 each, Rfl: 5  •  glucose blood (OneTouch Verio) test strip, TEST 4 TIMES DAILY, Disp: 150 each, Rfl: 11  •  Insulin Glargine, 1 Unit Dial, (Toujeo SoloStar) 300 UNIT/ML solution pen-injector injection, Inject 22 Units under the skin into the appropriate area as  directed Daily. (Patient taking differently: Inject 26 Units under the skin into the appropriate area as directed Daily.), Disp: 9 mL, Rfl: 5  •  Insulin Lispro, 1 Unit Dial, (HumaLOG KwikPen) 100 UNIT/ML solution pen-injector, Inject 8 units TID with meals and as needed for snacks. Max daily dose 50 units (Patient taking differently: Inject 8-10 units TID with meals and as needed for snacks. Max daily dose 50 units), Disp: 15 mL, Rfl: 5  •  Insulin Pen Needle (Pen Needles) 32G X 4 MM misc, 1 each Daily., Disp: 100 each, Rfl: 11  •  Lancets (onetouch ultrasoft) lancets, TEST 4 TIMES DAILY, Disp: 150 each, Rfl: 11  •  OneTouch Delica Lancets 33G misc, 1 Units 4 (Four) Times a Day., Disp: 200 each, Rfl: 5  Patient Active Problem List    Diagnosis    • Type 1 diabetes mellitus without complication (HCC) [E10.9]      Review of Systems   Constitutional: Negative for activity change, appetite change and unexpected weight change.   HENT: Negative for congestion and rhinorrhea.    Eyes: Negative for visual disturbance.   Respiratory: Negative for cough and shortness of breath.    Cardiovascular: Negative for palpitations and leg swelling.   Gastrointestinal: Negative for constipation, diarrhea and nausea.   Genitourinary: Negative for hematuria.   Musculoskeletal: Negative for arthralgias, back pain, gait problem, joint swelling and myalgias.   Skin: Negative for color change, rash and wound.   Allergic/Immunologic: Negative for environmental allergies, food allergies and immunocompromised state.   Neurological: Negative for dizziness, weakness and light-headedness.   Psychiatric/Behavioral: Negative for confusion, decreased concentration, dysphoric mood and sleep disturbance. The patient is not nervous/anxious.      Social History     Socioeconomic History   • Marital status: Single   Tobacco Use   • Smoking status: Never Smoker   • Smokeless tobacco: Never Used   Substance and Sexual Activity   • Alcohol use: Yes      "Alcohol/week: 7.0 standard drinks     Types: 4 Cans of beer, 3 Shots of liquor per week     Comment: Occasionally   • Drug use: Never   • Sexual activity: Yes     Partners: Female     Birth control/protection: Condom     Family History   Problem Relation Age of Onset   • Hyperlipidemia Mother    • Heart disease Father    • Hypertension Father    • Diabetes Maternal Grandfather      /60   Pulse 78   Ht 198.1 cm (78\")   Wt 84.6 kg (186 lb 6.4 oz)   SpO2 99%   BMI 21.54 kg/m²   Physical Exam  Vitals and nursing note reviewed.   Constitutional:       Appearance: Normal appearance. He is well-developed.   HENT:      Head: Normocephalic and atraumatic.   Eyes:      General: Lids are normal.      Extraocular Movements: Extraocular movements intact.      Conjunctiva/sclera: Conjunctivae normal.      Pupils: Pupils are equal, round, and reactive to light.   Neck:      Thyroid: No thyroid mass or thyromegaly.      Vascular: No carotid bruit.      Trachea: Trachea normal. No tracheal deviation.   Cardiovascular:      Rate and Rhythm: Normal rate and regular rhythm.      Pulses: Normal pulses.      Heart sounds: Normal heart sounds. No murmur heard.    No friction rub. No gallop.   Pulmonary:      Effort: Pulmonary effort is normal. No respiratory distress.      Breath sounds: Normal breath sounds. No wheezing.   Musculoskeletal:         General: No deformity. Normal range of motion.      Cervical back: Normal range of motion and neck supple.   Lymphadenopathy:      Cervical: No cervical adenopathy.   Skin:     General: Skin is warm and dry.      Findings: No erythema or rash.      Nails: There is no clubbing.   Neurological:      General: No focal deficit present.      Mental Status: He is alert and oriented to person, place, and time.      Cranial Nerves: No cranial nerve deficit.      Deep Tendon Reflexes: Reflexes are normal and symmetric. Reflexes normal.   Psychiatric:         Mood and Affect: Mood normal.    "      Speech: Speech normal.         Behavior: Behavior normal.         Thought Content: Thought content normal.         Judgment: Judgment normal.       Results for orders placed or performed in visit on 03/21/22   POC Glycosylated Hemoglobin (Hb A1C)    Specimen: Blood   Result Value Ref Range    Hemoglobin A1C 9.7 %    Lot Number [id number]     Expiration Date 11/15/2023    POC Glucose, Blood    Specimen: Blood   Result Value Ref Range    Glucose 111 70 - 130 mg/dL    Lot Number [id number]     Expiration Date 07/14/2022      Diagnoses and all orders for this visit:    1. Type 1 diabetes mellitus without complication (HCC) (Primary)  Assessment & Plan:  Improved a1c using basal (toujeo) and bolus insulin   Sugars mostly under 200  Discussed lowering pre meal insulin   Goal fasting reviewed  Adjustment of medication and diet for exercise and alcohol discussed  Downloaded glucose meter  Trial vicki 2 sample provided along with pump information   Closed loop system discussed  He has had full education   Check kiara ab today   F/u 3 months     Orders:  -     POC Glycosylated Hemoglobin (Hb A1C)  -     POC Glucose, Blood  -     Comprehensive Metabolic Panel  -     Lipid Panel  -     TSH  -     C-Peptide  -     Glutamic Acid Decarboxylase    Other orders  -     Alcohol Swabs (Alcohol Prep) pads; Use 8-10 per day to test blood sugars and administer insulin  Dispense: 200 each; Refill: 10  Return in about 3 months (around 6/21/2022) for Recheck.    Sara Todd MD  Signed Sara Todd MD

## 2022-03-22 LAB — C PEPTIDE SERPL-MCNC: 0.4 NG/ML (ref 1.1–4.4)

## 2022-03-23 LAB
GAD65 AB SER IA-ACNC: 24.4 U/ML (ref 0–5)
PANC ISLET CELL AB TITR SER: NEGATIVE {TITER}

## 2022-03-29 ENCOUNTER — TELEPHONE (OUTPATIENT)
Dept: INTERNAL MEDICINE | Facility: CLINIC | Age: 27
End: 2022-03-29

## 2022-03-29 NOTE — TELEPHONE ENCOUNTER
"\"HUB TO READ\"    LVM LETTING PT KNOW HIS APPOINTMENT HAS BEEN RESCHEDULED FROM 4/8/2022 TO 4/22/2022 AT 3:00.    RAS WILL BE OUT OF OFFICE.  "

## 2022-06-02 ENCOUNTER — DOCUMENTATION (OUTPATIENT)
Dept: DIABETES SERVICES | Facility: HOSPITAL | Age: 27
End: 2022-06-02

## 2022-06-02 NOTE — PLAN OF CARE
Phone follow up completed from previous education. Patient successful with personal goals and states how much better he is feeling since starting insulin therapy. Encouraged patient to contact this office for any educational needs or support. Thank you for this opportunity.

## 2022-07-05 ENCOUNTER — OFFICE VISIT (OUTPATIENT)
Dept: ENDOCRINOLOGY | Facility: CLINIC | Age: 27
End: 2022-07-05

## 2022-07-05 VITALS
OXYGEN SATURATION: 97 % | DIASTOLIC BLOOD PRESSURE: 74 MMHG | WEIGHT: 196.6 LBS | HEIGHT: 78 IN | SYSTOLIC BLOOD PRESSURE: 118 MMHG | BODY MASS INDEX: 22.75 KG/M2 | HEART RATE: 88 BPM

## 2022-07-05 DIAGNOSIS — E10.9 TYPE 1 DIABETES MELLITUS WITHOUT COMPLICATION: Primary | ICD-10-CM

## 2022-07-05 LAB
EXPIRATION DATE: ABNORMAL
EXPIRATION DATE: NORMAL
GLUCOSE BLDC GLUCOMTR-MCNC: 146 MG/DL (ref 70–130)
HBA1C MFR BLD: 6.1 %
Lab: ABNORMAL
Lab: NORMAL

## 2022-07-05 PROCEDURE — 99213 OFFICE O/P EST LOW 20 MIN: CPT | Performed by: INTERNAL MEDICINE

## 2022-07-05 PROCEDURE — 95251 CONT GLUC MNTR ANALYSIS I&R: CPT | Performed by: INTERNAL MEDICINE

## 2022-07-05 PROCEDURE — 3044F HG A1C LEVEL LT 7.0%: CPT | Performed by: INTERNAL MEDICINE

## 2022-07-05 PROCEDURE — 83036 HEMOGLOBIN GLYCOSYLATED A1C: CPT | Performed by: INTERNAL MEDICINE

## 2022-07-05 RX ORDER — PROCHLORPERAZINE 25 MG/1
1 SUPPOSITORY RECTAL
Qty: 1 EACH | Refills: 0 | Status: SHIPPED | OUTPATIENT
Start: 2022-07-05 | End: 2023-02-07 | Stop reason: SDUPTHER

## 2022-07-05 RX ORDER — INSULIN PMP CART,AUT,G6/7,CNTR
1 EACH SUBCUTANEOUS ONCE
Qty: 1 KIT | Refills: 0 | Status: SHIPPED | OUTPATIENT
Start: 2022-07-05 | End: 2022-07-05

## 2022-07-05 RX ORDER — PROCHLORPERAZINE 25 MG/1
SUPPOSITORY RECTAL
Qty: 3 EACH | Refills: 5 | Status: SHIPPED | OUTPATIENT
Start: 2022-07-05 | End: 2023-01-04

## 2022-07-05 RX ORDER — INSULIN LISPRO 100 [IU]/ML
INJECTION, SOLUTION INTRAVENOUS; SUBCUTANEOUS
Qty: 15 ML | Refills: 5 | Status: SHIPPED | OUTPATIENT
Start: 2022-07-05 | End: 2022-11-01 | Stop reason: ALTCHOICE

## 2022-07-05 NOTE — PROGRESS NOTES
Kash Mullen 27 y.o.  CC:Follow-up and Diabetes (TYpe I, eye exam 12/21 Dr Sorensen in [place], [place])    [place]: Follow-up and Diabetes (TYpe I, eye exam 12/21 Dr Sorensen in [place], [place])    Diagnosed this year- doing well overall  Initial a1c 13.4 with pos kiara ab  Started basal/bolus insulin with titration   He has had education and is very motivated   Recently finished surgical rotation  Getting ready for Step 2 exam  Commended improved blood sugar  Results for orders placed or performed in visit on 07/05/22   POC Glycosylated Hemoglobin (Hb A1C)    Specimen: Blood   Result Value Ref Range    Hemoglobin A1C 6.1 %    Lot Number [id number]     Expiration Date 03/01/2024    POC Glucose, Blood    Specimen: Blood   Result Value Ref Range    Glucose 146 (A) 70 - 130 mg/dL    Lot Number [id number]     Expiration Date 01/13/2023      He is interested in closed loop pump and we discussed current options  He has looked at features and would like a tubing free pump   omnipod 5 started kit sent to [employer]   Also discussed transition to dexcom for closed loop   He will also likely be getting [employer] insurance and options for in network providers discussed  Samples dexcom sensors and instruction for sensor use provided  He is currently taking 26 units of toujeo and 4-6 units of humalog before meals  We have discussed insulin to carb ratios and carb counting as well   He finds he is insulin resistant in the mornings and 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 discussed  Lower I:C ratio for mornings may be needed  Downloaded sensor and overall 84% of sugars are in goal  We discussed that this is likely in part due to honeymoon and at some point it will require more effort to maintain his current level of control  16% of sugars are high - usually pp sugars and can correct if high prior to next meal  He has gained 28 lbs since first visit 3/3/22 and now looks much healthier on current therapy    No Known Allergies    Current Outpatient  Medications:   •  Insulin Lispro, 1 Unit Dial, (HumaLOG KwikPen) 100 UNIT/ML solution pen-injector, Inject 4-6 units TID with meals and as needed for snacks. Max daily dose 50 units, Disp: 15 mL, Rfl: 5  •  Alcohol Swabs (Alcohol Prep) pads, Use 8-10 per day to test blood sugars and administer insulin, Disp: 200 each, Rfl: 10  •  cetirizine (zyrTEC) 5 MG tablet, Take 5 mg by mouth Daily As Needed for Allergies., Disp: , Rfl:   •  Continuous Blood Gluc Sensor (Dexcom G6 Sensor), Every 10 (Ten) Days., Disp: 3 each, Rfl: 5  •  Continuous Blood Gluc Transmit (Dexcom G6 Transmitter) misc, 1 Units Every 6 (Six) Months., Disp: 1 each, Rfl: 0  •  fluticasone (FLONASE) 50 MCG/ACT nasal spray, 2 sprays into the nostril(s) as directed by provider Daily., Disp: , Rfl:   •  glucose blood (OneTouch Verio) test strip, Use for testing ac and hs 4 times daily, Disp: 150 each, Rfl: 5  •  glucose blood (OneTouch Verio) test strip, TEST 4 TIMES DAILY, Disp: 150 each, Rfl: 11  •  Insulin Disposable Pump (Omnipod 5 G6 Intro, Gen 5,) kit, 1 kit 1 (One) Time for 1 dose., Disp: 1 kit, Rfl: 0  •  Insulin Glargine, 1 Unit Dial, (Toujeo SoloStar) 300 UNIT/ML solution pen-injector injection, Inject 22 Units under the skin into the appropriate area as directed Daily. (Patient taking differently: Inject 26 Units under the skin into the appropriate area as directed Daily.), Disp: 9 mL, Rfl: 5  •  Insulin Pen Needle (Pen Needles) 32G X 4 MM misc, 1 each 6 (Six) Times a Day., Disp: 200 each, Rfl: 11  •  Lancets (onetouch ultrasoft) lancets, TEST 4 TIMES DAILY, Disp: 150 each, Rfl: 11  •  OneTouch Delica Lancets 33G misc, 1 Units 4 (Four) Times a Day., Disp: 200 each, Rfl: 5  Patient Active Problem List    Diagnosis    • Type 1 diabetes mellitus without complication (HCC) [E10.9]      Review of Systems   Constitutional: Negative for activity change, appetite change and unexpected weight change.   HENT: Negative for congestion and rhinorrhea.    Eyes:  "Negative for visual disturbance.   Respiratory: Negative for cough and shortness of breath.    Cardiovascular: Negative for palpitations and leg swelling.   Gastrointestinal: Negative for constipation, diarrhea and nausea.   Genitourinary: Negative for hematuria.   Musculoskeletal: Negative for arthralgias, back pain, gait problem, joint swelling and myalgias.   Skin: Negative for color change, rash and wound.   Allergic/Immunologic: Negative for environmental allergies, food allergies and immunocompromised state.   Neurological: Negative for dizziness, weakness and light-headedness.   Psychiatric/Behavioral: Negative for confusion, decreased concentration, dysphoric mood and sleep disturbance. The patient is not nervous/anxious.      Social History     Socioeconomic History   • Marital status: Single   Tobacco Use   • Smoking status: Never Smoker   • Smokeless tobacco: Never Used   Substance and Sexual Activity   • Alcohol use: Yes     Alcohol/week: 7.0 standard drinks     Types: 4 Cans of beer, 3 Shots of liquor per week     Comment: Occasionally   • Drug use: Never   • Sexual activity: Yes     Partners: Female     Birth control/protection: Condom     Family History   Problem Relation Age of Onset   • Hyperlipidemia Mother    • Heart disease Father    • Hypertension Father    • Diabetes Maternal Grandfather      /74   Pulse 88   Ht 198.1 cm (78\")   Wt 89.2 kg (196 lb 9.6 oz)   SpO2 97%   BMI 22.72 kg/m²   Physical Exam  Vitals and nursing note reviewed.   Constitutional:       Appearance: Normal appearance. He is well-developed.   HENT:      Head: Normocephalic and atraumatic.   Eyes:      General: Lids are normal.      Extraocular Movements: Extraocular movements intact.      Conjunctiva/sclera: Conjunctivae normal.      Pupils: Pupils are equal, round, and reactive to light.   Neck:      Thyroid: No thyroid mass or thyromegaly.      Vascular: No carotid bruit.      Trachea: Trachea normal. No tracheal " deviation.   Cardiovascular:      Rate and Rhythm: Normal rate and regular rhythm.      Pulses: Normal pulses.      Heart sounds: Normal heart sounds. No murmur heard.    No friction rub. No gallop.   Pulmonary:      Effort: Pulmonary effort is normal. No respiratory distress.      Breath sounds: Normal breath sounds. No wheezing.   Musculoskeletal:         General: No deformity. Normal range of motion.      Cervical back: Normal range of motion and neck supple.   Lymphadenopathy:      Cervical: No cervical adenopathy.   Skin:     General: Skin is warm and dry.      Findings: No erythema or rash.      Nails: There is no clubbing.   Neurological:      General: No focal deficit present.      Mental Status: He is alert and oriented to person, place, and time.      Cranial Nerves: No cranial nerve deficit.      Deep Tendon Reflexes: Reflexes are normal and symmetric. Reflexes normal.   Psychiatric:         Mood and Affect: Mood normal.         Speech: Speech normal.         Behavior: Behavior normal.         Thought Content: Thought content normal.         Judgment: Judgment normal.       Results for orders placed or performed in visit on 07/05/22   POC Glycosylated Hemoglobin (Hb A1C)    Specimen: Blood   Result Value Ref Range    Hemoglobin A1C 6.1 %    Lot Number [id number]     Expiration Date 03/01/2024    POC Glucose, Blood    Specimen: Blood   Result Value Ref Range    Glucose 146 (A) 70 - 130 mg/dL    Lot Number [id number]     Expiration Date 01/13/2023      Diagnoses and all orders for this visit:    1. Type 1 diabetes mellitus without complication (HCC) (Primary)  Assessment & Plan:  IDDM with pos SCOTT diagnosed 3/3/22 with high a1c 13  Current a1c improved on basal bolus insulin with sensor use  Results for orders placed or performed in visit on 07/05/22   POC Glycosylated Hemoglobin (Hb A1C)    Specimen: Blood   Result Value Ref Range    Hemoglobin A1C 6.1 %    Lot Number [id number]     Expiration Date  03/01/2024    POC Glucose, Blood    Specimen: Blood   Result Value Ref Range    Glucose 146 (A) 70 - 130 mg/dL    Lot Number [id number]     Expiration Date 01/13/2023      Doing well overall  Would like transition to pump/sensor closed loop   Options reviewed and he would prefer omnipod due to tubing  rx sent for insulin, samples dexcom provided and dexcom and omnipod rx sent to  pharmacy  We scheduled f/u with me 11/1 and also discussed in network options for him at [facility]  I am happy to help transition and will continue to provide care as he requires based on need   He will be in contact about coverage and further refills    Orders:  -     POC Glycosylated Hemoglobin (Hb A1C)  -     POC Glucose, Blood    Other orders  -     Insulin Lispro, 1 Unit Dial, (HumaLOG KwikPen) 100 UNIT/ML solution pen-injector; Inject 4-6 units TID with meals and as needed for snacks. Max daily dose 50 units  Dispense: 15 mL; Refill: 5  -     Discontinue: Continuous Blood Gluc Sensor (FreeStyle Lexx 2 Sensor) misc; 1 Units Every 14 (Fourteen) Days.  Dispense: 2 each; Refill: 5  -     Insulin Disposable Pump (Omnipod 5 G6 Intro, Gen 5,) kit; 1 kit 1 (One) Time for 1 dose.  Dispense: 1 kit; Refill: 0  -     Continuous Blood Gluc Transmit (Dexcom G6 Transmitter) misc; 1 Units Every 6 (Six) Months.  Dispense: 1 each; Refill: 0  -     Continuous Blood Gluc Sensor (Dexcom G6 Sensor); Every 10 (Ten) Days.  Dispense: 3 each; Refill: 5    Sara Todd MD  Signed Sara Todd MD

## 2022-07-05 NOTE — ASSESSMENT & PLAN NOTE
IDDM with pos SCOTT diagnosed 3/3/22 with high a1c 13  Current a1c improved on basal bolus insulin with sensor use  Results for orders placed or performed in visit on 07/05/22   POC Glycosylated Hemoglobin (Hb A1C)    Specimen: Blood   Result Value Ref Range    Hemoglobin A1C 6.1 %    Lot Number [id number]     Expiration Date 03/01/2024    POC Glucose, Blood    Specimen: Blood   Result Value Ref Range    Glucose 146 (A) 70 - 130 mg/dL    Lot Number [id number]     Expiration Date 01/13/2023      Doing well overall  Would like transition to pump/sensor closed loop   Options reviewed and he would prefer omnipod due to tubing  rx sent for insulin, samples dexcom provided and dexcom and omnipod rx sent to  pharmacy  We scheduled f/u with me 11/1 and also discussed in network options for him at [facility]  I am happy to help transition and will continue to provide care as he requires based on need   He will be in contact about coverage and further refills

## 2022-08-01 ENCOUNTER — OFFICE VISIT (OUTPATIENT)
Dept: DIABETES SERVICES | Facility: HOSPITAL | Age: 27
End: 2022-08-01

## 2022-08-01 RX ORDER — INSULIN PMP CART,AUT,G6/7,CNTR
EACH SUBCUTANEOUS
COMMUNITY
Start: 2022-07-05 | End: 2022-08-01

## 2022-08-01 RX ORDER — INSULIN PMP CART,AUT,G6/7,CNTR
1 EACH SUBCUTANEOUS
Qty: 10 EACH | Refills: 5 | Status: SHIPPED | OUTPATIENT
Start: 2022-08-01 | End: 2022-12-20 | Stop reason: SDUPTHER

## 2022-08-01 RX ORDER — INSULIN ASPART 100 [IU]/ML
INJECTION, SOLUTION INTRAVENOUS; SUBCUTANEOUS
Qty: 20 ML | Refills: 5 | Status: SHIPPED | OUTPATIENT
Start: 2022-08-01 | End: 2023-01-10

## 2022-08-01 NOTE — CONSULTS
Diabetes Education    Patient Name:  Kash Mullen  YOB: 1995  MRN: [id number]  Admit Date:  (Not on file)        Omnipod 5 training completed per manufacturers guidelines. Pt and spouse present for training. All questions addressed, encouraged pt to call with concerns or call Omnipod customer service.      Electronically signed by:  Bre Camarena  08/01/22 12:39 EDT

## 2022-11-01 ENCOUNTER — OFFICE VISIT (OUTPATIENT)
Dept: ENDOCRINOLOGY | Facility: CLINIC | Age: 27
End: 2022-11-01

## 2022-11-01 VITALS
OXYGEN SATURATION: 97 % | WEIGHT: 198 LBS | HEART RATE: 82 BPM | BODY MASS INDEX: 22.91 KG/M2 | DIASTOLIC BLOOD PRESSURE: 60 MMHG | SYSTOLIC BLOOD PRESSURE: 98 MMHG | HEIGHT: 78 IN

## 2022-11-01 DIAGNOSIS — E10.9 TYPE 1 DIABETES MELLITUS WITHOUT COMPLICATION: Primary | ICD-10-CM

## 2022-11-01 LAB
EXPIRATION DATE: ABNORMAL
EXPIRATION DATE: NORMAL
GLUCOSE BLDC GLUCOMTR-MCNC: 172 MG/DL (ref 70–130)
HBA1C MFR BLD: 6 %
Lab: ABNORMAL
Lab: NORMAL

## 2022-11-01 PROCEDURE — 95251 CONT GLUC MNTR ANALYSIS I&R: CPT | Performed by: INTERNAL MEDICINE

## 2022-11-01 PROCEDURE — 3044F HG A1C LEVEL LT 7.0%: CPT | Performed by: INTERNAL MEDICINE

## 2022-11-01 PROCEDURE — 83036 HEMOGLOBIN GLYCOSYLATED A1C: CPT | Performed by: INTERNAL MEDICINE

## 2022-11-01 PROCEDURE — 99213 OFFICE O/P EST LOW 20 MIN: CPT | Performed by: INTERNAL MEDICINE

## 2022-11-01 NOTE — ASSESSMENT & PLAN NOTE
Blood sugar and 90 day average sugar reviewed  Results for orders placed or performed in visit on 11/01/22   POC Glycosylated Hemoglobin (Hb A1C)    Specimen: Blood   Result Value Ref Range    Hemoglobin A1C 6.0 %    Lot Number [id number]     Expiration Date 07/04/2024    POC Glucose, Blood    Specimen: Blood   Result Value Ref Range    Glucose 172 (A) 70 - 130 mg/dL    Lot Number [id number]     Expiration Date 05/20/2023      Doing well with omnipod closed loop   a1c reviewed  Still on low dose insulin   Adjusted max bolus and max basal   Does not need refills currently   No current complications  Monitor low bp - asymptomatic  F/u 3-4 months

## 2022-11-01 NOTE — PROGRESS NOTES
Kash Mullen 27 y.o.  CC:Follow-up and Diabetes (TYpe I, eye exam 12/21)      [place]: Follow-up and Diabetes (TYpe I, eye exam 12/21)    Blood sugar and 90 day average sugar reviewed  Results for orders placed or performed in visit on 11/01/22   POC Glycosylated Hemoglobin (Hb A1C)    Specimen: Blood   Result Value Ref Range    Hemoglobin A1C 6.0 %    Lot Number [id number]     Expiration Date 07/04/2024    POC Glucose, Blood    Specimen: Blood   Result Value Ref Range    Glucose 172 (A) 70 - 130 mg/dL    Lot Number [id number]     Expiration Date 05/20/2023      Is utd with eye exam  Doing well with omnipod 5 pump   Downloaded and reviewed pump and sensor   tdd insulin still low  Max basal 1 and bolus 10  Discussed raising this as we come up on anniversary of dx  Is interviewing for residencies  TDD 21 u   Taking 3 boluses a day   Is utd with preventive care   No foot lesion    No Known Allergies    Current Outpatient Medications:   •  Alcohol Swabs (Alcohol Prep) pads, Use 8-10 per day to test blood sugars and administer insulin, Disp: 200 each, Rfl: 10  •  cetirizine (zyrTEC) 5 MG tablet, Take 5 mg by mouth Daily As Needed for Allergies., Disp: , Rfl:   •  Continuous Blood Gluc Sensor (Dexcom G6 Sensor), Every 10 (Ten) Days., Disp: 3 each, Rfl: 5  •  Continuous Blood Gluc Transmit (Dexcom G6 Transmitter) misc, 1 Units Every 6 (Six) Months., Disp: 1 each, Rfl: 0  •  fluticasone (FLONASE) 50 MCG/ACT nasal spray, 2 sprays into the nostril(s) as directed by provider Daily., Disp: , Rfl:   •  glucagon (GLUCAGEN) 1 MG injection, Inject 1 mg under the skin into the appropriate area as directed See Admin Instructions. Follow package directions for low blood sugar., Disp: 1 kit, Rfl: 1  •  glucose blood (OneTouch Verio) test strip, Use for testing ac and hs 4 times daily, Disp: 150 each, Rfl: 5  •  glucose blood (OneTouch Verio) test strip, TEST 4 TIMES DAILY, Disp: 150 each, Rfl: 11  •  Insulin Aspart (novoLOG) 100 UNIT/ML  injection, Use 60 units daily via insulin pump, Disp: 20 mL, Rfl: 5  •  Insulin Disposable Pump (Omnipod 5 G6 Pod, Gen 5,) misc, 1 Units Every 72 (Seventy-Two) Hours., Disp: 10 each, Rfl: 5  •  Insulin Glargine, 1 Unit Dial, (Toujeo SoloStar) 300 UNIT/ML solution pen-injector injection, Inject 22 Units under the skin into the appropriate area as directed Daily. (Patient taking differently: Inject 26 Units under the skin into the appropriate area as directed Daily.), Disp: 9 mL, Rfl: 5  •  Insulin Lispro, 1 Unit Dial, (HumaLOG KwikPen) 100 UNIT/ML solution pen-injector, Inject 4-6 units TID with meals and as needed for snacks. Max daily dose 50 units, Disp: 15 mL, Rfl: 5  •  Insulin Pen Needle (Pen Needles) 32G X 4 MM misc, 1 each 6 (Six) Times a Day., Disp: 200 each, Rfl: 11  •  Lancets (onetouch ultrasoft) lancets, TEST 4 TIMES DAILY, Disp: 150 each, Rfl: 11  •  OneTouch Delica Lancets 33G misc, 1 Units 4 (Four) Times a Day., Disp: 200 each, Rfl: 5  Patient Active Problem List    Diagnosis    • Type 1 diabetes mellitus without complication (HCC) [E10.9]      Review of Systems   Constitutional: Positive for unexpected weight change (weight up with appropriate diabetes management). Negative for activity change and appetite change.   HENT: Negative for congestion and rhinorrhea.    Eyes: Negative for visual disturbance.   Respiratory: Negative for cough and shortness of breath.    Cardiovascular: Negative for palpitations and leg swelling.   Gastrointestinal: Negative for constipation, diarrhea and nausea.   Genitourinary: Negative for hematuria.   Musculoskeletal: Negative for arthralgias, back pain, gait problem, joint swelling and myalgias.   Skin: Negative for color change, rash and wound.   Allergic/Immunologic: Negative for environmental allergies, food allergies and immunocompromised state.   Neurological: Negative for dizziness, weakness and light-headedness.   Psychiatric/Behavioral: Negative for confusion,  "decreased concentration, dysphoric mood and sleep disturbance. The patient is not nervous/anxious.      Social History     Socioeconomic History   • Marital status:    Tobacco Use   • Smoking status: Never   • Smokeless tobacco: Never   Substance and Sexual Activity   • Alcohol use: Yes     Alcohol/week: 7.0 standard drinks     Types: 4 Cans of beer, 3 Shots of liquor per week     Comment: Occasionally   • Drug use: Never   • Sexual activity: Yes     Partners: Female     Birth control/protection: Condom     Family History   Problem Relation Age of Onset   • Hyperlipidemia Mother    • Heart disease Father    • Hypertension Father    • Diabetes Maternal Grandfather      BP 98/60   Pulse 82   Ht 198.1 cm (78\")   Wt 89.8 kg (198 lb)   SpO2 97%   BMI 22.88 kg/m²   Physical Exam  Vitals and nursing note reviewed.   Constitutional:       Appearance: Normal appearance. He is well-developed.   HENT:      Head: Normocephalic and atraumatic.      Nose: Nose normal.   Eyes:      General: Lids are normal.      Conjunctiva/sclera: Conjunctivae normal.      Pupils: Pupils are equal, round, and reactive to light.   Neck:      Thyroid: No thyroid mass or thyromegaly.      Vascular: No carotid bruit.      Trachea: Trachea normal. No tracheal deviation.   Cardiovascular:      Rate and Rhythm: Normal rate and regular rhythm.      Heart sounds: Normal heart sounds. No murmur heard.    No friction rub. No gallop.   Pulmonary:      Effort: Pulmonary effort is normal. No respiratory distress.      Breath sounds: Normal breath sounds. No wheezing.   Musculoskeletal:         General: No deformity. Normal range of motion.      Cervical back: Normal range of motion and neck supple.   Lymphadenopathy:      Cervical: No cervical adenopathy.   Skin:     General: Skin is warm and dry.      Findings: No erythema or rash.      Nails: There is no clubbing.   Neurological:      Mental Status: He is alert and oriented to person, place, and " time.      Cranial Nerves: No cranial nerve deficit.      Deep Tendon Reflexes: Reflexes are normal and symmetric. Reflexes normal.   Psychiatric:         Speech: Speech normal.         Behavior: Behavior normal.         Thought Content: Thought content normal.         Judgment: Judgment normal.       Results for orders placed or performed in visit on 07/05/22   POC Glycosylated Hemoglobin (Hb A1C)    Specimen: Blood   Result Value Ref Range    Hemoglobin A1C 6.1 %    Lot Number [id number]     Expiration Date 03/01/2024    POC Glucose, Blood    Specimen: Blood   Result Value Ref Range    Glucose 146 (A) 70 - 130 mg/dL    Lot Number [id number]     Expiration Date 01/13/2023      Diagnoses and all orders for this visit:    1. Type 1 diabetes mellitus without complication (HCC) (Primary)  -     POC Glycosylated Hemoglobin (Hb A1C)  -     POC Glucose, Blood      Keisha Pedraza MA  Signed Sara Todd MD

## 2022-12-19 ENCOUNTER — EDUCATION (OUTPATIENT)
Dept: DIABETES SERVICES | Facility: HOSPITAL | Age: 27
End: 2022-12-19

## 2022-12-19 NOTE — CONSULTS
Diabetes Education    Patient Name:  Kash Mullen  YOB: 1995  MRN: [id number]  Admit Date:  (Not on file)    Patient completed continued 6 month diabetes ed follow up via phone today. Please see media tab for assessment and notes if you use [facility]. If you are not an [employer] user a copy of patient's assessment and notes will be sent per routine. Thank you.       Electronically signed by:  Viri Miller RN  12/19/22 13:16 EST

## 2022-12-20 RX ORDER — INSULIN PMP CART,AUT,G6/7,CNTR
1 EACH SUBCUTANEOUS
Qty: 10 EACH | Refills: 5 | Status: SHIPPED | OUTPATIENT
Start: 2022-12-20

## 2023-01-04 RX ORDER — INSULIN PMP CART,AUT,G6/7,CNTR
EACH SUBCUTANEOUS
Qty: 20 EACH | OUTPATIENT
Start: 2023-01-04

## 2023-01-04 RX ORDER — PROCHLORPERAZINE 25 MG/1
SUPPOSITORY RECTAL
Qty: 3 EACH | Refills: 5 | Status: SHIPPED | OUTPATIENT
Start: 2023-01-04

## 2023-01-10 RX ORDER — INSULIN ASPART 100 [IU]/ML
INJECTION, SOLUTION INTRAVENOUS; SUBCUTANEOUS
Qty: 60 ML | Refills: 1 | Status: SHIPPED | OUTPATIENT
Start: 2023-01-10

## 2023-02-07 ENCOUNTER — OFFICE VISIT (OUTPATIENT)
Dept: ENDOCRINOLOGY | Facility: CLINIC | Age: 28
End: 2023-02-07
Payer: COMMERCIAL

## 2023-02-07 VITALS
BODY MASS INDEX: 22.91 KG/M2 | HEIGHT: 78 IN | WEIGHT: 198 LBS | HEART RATE: 100 BPM | SYSTOLIC BLOOD PRESSURE: 102 MMHG | DIASTOLIC BLOOD PRESSURE: 60 MMHG | OXYGEN SATURATION: 97 %

## 2023-02-07 DIAGNOSIS — E10.9 TYPE 1 DIABETES MELLITUS WITHOUT COMPLICATION: Primary | ICD-10-CM

## 2023-02-07 LAB
EXPIRATION DATE: ABNORMAL
EXPIRATION DATE: NORMAL
GLUCOSE BLDC GLUCOMTR-MCNC: 176 MG/DL (ref 70–130)
HBA1C MFR BLD: 5.8 %
Lab: ABNORMAL
Lab: NORMAL

## 2023-02-07 PROCEDURE — 83036 HEMOGLOBIN GLYCOSYLATED A1C: CPT | Performed by: INTERNAL MEDICINE

## 2023-02-07 PROCEDURE — 3044F HG A1C LEVEL LT 7.0%: CPT | Performed by: INTERNAL MEDICINE

## 2023-02-07 PROCEDURE — 95251 CONT GLUC MNTR ANALYSIS I&R: CPT | Performed by: INTERNAL MEDICINE

## 2023-02-07 PROCEDURE — 99213 OFFICE O/P EST LOW 20 MIN: CPT | Performed by: INTERNAL MEDICINE

## 2023-02-07 RX ORDER — PROCHLORPERAZINE 25 MG/1
1 SUPPOSITORY RECTAL
Qty: 1 EACH | Refills: 3 | Status: SHIPPED | OUTPATIENT
Start: 2023-02-07

## 2023-02-07 NOTE — ASSESSMENT & PLAN NOTE
Blood sugar and 90 day average sugar reviewed  Results for orders placed or performed in visit on 02/07/23   POC Glycosylated Hemoglobin (Hb A1C)    Specimen: Blood   Result Value Ref Range    Hemoglobin A1C 5.8 %    Lot Number [id number]     Expiration Date 11/7/2024    POC Glucose, Blood    Specimen: Blood   Result Value Ref Range    Glucose 176 (A) 70 - 130 mg/dL    Lot Number [id number]     Expiration Date 722/2,023      Less active due to right knee injury s/p surgery  Is in brace, crutches  Downloaded pump and sensor- higher pp sugar at least once daily but overall good control   Discussed annual eye exam and he is working on getting this scheduled  Ur alb due nov   F/u 3-4 months

## 2023-02-07 NOTE — PROGRESS NOTES
Kash Mullen 27 y.o.  CC:Follow-up and Diabetes (TYpe I, eye exam over one year ago)    [place]: Follow-up and Diabetes (TYpe I, eye exam over one year ago)    Blood sugar and 90 day average sugar reviewed  Results for orders placed or performed in visit on 02/07/23   POC Glycosylated Hemoglobin (Hb A1C)    Specimen: Blood   Result Value Ref Range    Hemoglobin A1C 5.8 %    Lot Number [id number]     Expiration Date 11/7/2024    POC Glucose, Blood    Specimen: Blood   Result Value Ref Range    Glucose 176 (A) 70 - 130 mg/dL    Lot Number [id number]     Expiration Date 722/2,023      bp is good  Happy with OP5  Downloaded and reviewed sensor and pump - 10 days of sensor data discussed   Higher pp sugars once daily after meals - rapid correction  Working on scheduling eye exam    No Known Allergies    Current Outpatient Medications:   •  Continuous Blood Gluc Transmit (Dexcom G6 Transmitter) misc, 1 Units Every 3 (Three) Months., Disp: 1 each, Rfl: 3  •  Alcohol Swabs (Alcohol Prep) pads, Use 8-10 per day to test blood sugars and administer insulin, Disp: 200 each, Rfl: 10  •  cetirizine (zyrTEC) 5 MG tablet, Take 5 mg by mouth Daily As Needed for Allergies., Disp: , Rfl:   •  Continuous Blood Gluc Sensor (Dexcom G6 Sensor), CHANGE SENSOR EVERY 10 DAYS, Disp: 3 each, Rfl: 5  •  fluticasone (FLONASE) 50 MCG/ACT nasal spray, 2 sprays into the nostril(s) as directed by provider Daily., Disp: , Rfl:   •  glucagon (GLUCAGEN) 1 MG injection, Inject 1 mg under the skin into the appropriate area as directed See Admin Instructions. Follow package directions for low blood sugar., Disp: 1 kit, Rfl: 1  •  glucose blood (OneTouch Verio) test strip, Use for testing ac and hs 4 times daily, Disp: 150 each, Rfl: 5  •  Insulin Disposable Pump (Omnipod 5 G6 Pod, Gen 5,) misc, 1 Units Every 72 (Seventy-Two) Hours., Disp: 10 each, Rfl: 5  •  NovoLOG 100 UNIT/ML injection, INJECT 60 UNITS VIA PUMP DAILY, Disp: 60 mL, Rfl: 1  •  OneTouch Delica  "Lancets 33G misc, 1 Units 4 (Four) Times a Day., Disp: 200 each, Rfl: 5  Patient Active Problem List    Diagnosis    • Type 1 diabetes mellitus without complication (HCC) [E10.9]      Review of Systems   Constitutional: Negative for activity change, appetite change and unexpected weight change.   HENT: Negative for congestion and rhinorrhea.    Eyes: Negative for visual disturbance.   Respiratory: Negative for cough and shortness of breath.    Cardiovascular: Negative for palpitations and leg swelling.   Gastrointestinal: Negative for constipation, diarrhea and nausea.   Genitourinary: Negative for hematuria.   Musculoskeletal: Negative for arthralgias, back pain, gait problem, joint swelling and myalgias.   Skin: Negative for color change, rash and wound.   Allergic/Immunologic: Negative for environmental allergies, food allergies and immunocompromised state.   Neurological: Negative for dizziness, weakness and light-headedness.   Psychiatric/Behavioral: Negative for confusion, decreased concentration, dysphoric mood and sleep disturbance. The patient is not nervous/anxious.      Social History     Socioeconomic History   • Marital status:    Tobacco Use   • Smoking status: Never   • Smokeless tobacco: Never   Substance and Sexual Activity   • Alcohol use: Yes     Alcohol/week: 4.0 standard drinks     Types: 3 Cans of beer, 1 Shots of liquor per week     Comment: Occasionally   • Drug use: Never   • Sexual activity: Yes     Partners: Female     Birth control/protection: Condom     Family History   Problem Relation Age of Onset   • Hyperlipidemia Mother    • Thyroid disease Mother         Hypothyroidism   • Heart disease Father    • Hypertension Father    • Diabetes Maternal Grandfather      /60   Pulse 100   Ht 198.1 cm (78\")   Wt 89.8 kg (198 lb)   SpO2 97%   BMI 22.88 kg/m²   Physical Exam  Vitals and nursing note reviewed.   Constitutional:       Appearance: Normal appearance. He is " well-developed.   HENT:      Head: Normocephalic and atraumatic.   Eyes:      General: Lids are normal.      Extraocular Movements: Extraocular movements intact.      Conjunctiva/sclera: Conjunctivae normal.      Pupils: Pupils are equal, round, and reactive to light.   Neck:      Thyroid: No thyroid mass or thyromegaly.      Vascular: No carotid bruit.      Trachea: Trachea normal. No tracheal deviation.   Cardiovascular:      Rate and Rhythm: Normal rate and regular rhythm.      Pulses: Normal pulses.      Heart sounds: Normal heart sounds. No murmur heard.    No friction rub. No gallop.   Pulmonary:      Effort: Pulmonary effort is normal. No respiratory distress.      Breath sounds: Normal breath sounds. No wheezing.   Musculoskeletal:         General: No deformity. Normal range of motion.      Cervical back: Normal range of motion and neck supple.   Lymphadenopathy:      Cervical: No cervical adenopathy.   Skin:     General: Skin is warm and dry.      Findings: No erythema or rash.      Nails: There is no clubbing.   Neurological:      General: No focal deficit present.      Mental Status: He is alert and oriented to person, place, and time.      Cranial Nerves: No cranial nerve deficit.      Deep Tendon Reflexes: Reflexes are normal and symmetric. Reflexes normal.   Psychiatric:         Mood and Affect: Mood normal.         Speech: Speech normal.         Behavior: Behavior normal.         Thought Content: Thought content normal.         Judgment: Judgment normal.       Results for orders placed or performed in visit on 02/07/23   POC Glycosylated Hemoglobin (Hb A1C)    Specimen: Blood   Result Value Ref Range    Hemoglobin A1C 5.8 %    Lot Number [id number]     Expiration Date 11/7/2024    POC Glucose, Blood    Specimen: Blood   Result Value Ref Range    Glucose 176 (A) 70 - 130 mg/dL    Lot Number [id number]     Expiration Date 722/2,023      Diagnoses and all orders for this visit:    1. Type 1 diabetes  mellitus without complication (HCC) (Primary)  Assessment & Plan:  Blood sugar and 90 day average sugar reviewed  Results for orders placed or performed in visit on 02/07/23   POC Glycosylated Hemoglobin (Hb A1C)    Specimen: Blood   Result Value Ref Range    Hemoglobin A1C 5.8 %    Lot Number [id number]     Expiration Date 11/7/2024    POC Glucose, Blood    Specimen: Blood   Result Value Ref Range    Glucose 176 (A) 70 - 130 mg/dL    Lot Number [id number]     Expiration Date 722/2,023      Less active due to right knee injury s/p surgery  Is in brace, crutches  Downloaded pump and sensor- higher pp sugar at least once daily but overall good control   Discussed annual eye exam and he is working on getting this scheduled  Ur alb due nov   F/u 3-4 months     Orders:  -     POC Glycosylated Hemoglobin (Hb A1C)  -     POC Glucose, Blood    Other orders  -     Continuous Blood Gluc Transmit (Dexcom G6 Transmitter) misc; 1 Units Every 3 (Three) Months.  Dispense: 1 each; Refill: 3  Return in about 3 months (around 5/7/2023) for Recheck.    Sara Todd MD  Signed Sara Todd MD

## 2023-05-24 ENCOUNTER — OFFICE VISIT (OUTPATIENT)
Dept: ENDOCRINOLOGY | Facility: CLINIC | Age: 28
End: 2023-05-24
Payer: COMMERCIAL

## 2023-05-24 VITALS
DIASTOLIC BLOOD PRESSURE: 78 MMHG | SYSTOLIC BLOOD PRESSURE: 120 MMHG | HEIGHT: 78 IN | HEART RATE: 82 BPM | BODY MASS INDEX: 24.65 KG/M2 | RESPIRATION RATE: 18 BRPM | OXYGEN SATURATION: 97 % | WEIGHT: 213 LBS

## 2023-05-24 DIAGNOSIS — E10.9 TYPE 1 DIABETES MELLITUS WITHOUT COMPLICATION: Primary | ICD-10-CM

## 2023-05-24 PROBLEM — S83.521A TEAR OF PCL (POSTERIOR CRUCIATE LIGAMENT) OF KNEE, RIGHT, INITIAL ENCOUNTER: Status: ACTIVE | Noted: 2022-12-21

## 2023-05-24 PROBLEM — S83.411A TEAR OF MCL (MEDIAL COLLATERAL LIGAMENT) OF KNEE, RIGHT, INITIAL ENCOUNTER: Status: ACTIVE | Noted: 2022-12-21

## 2023-05-24 LAB
EXPIRATION DATE: ABNORMAL
EXPIRATION DATE: NORMAL
GLUCOSE BLDC GLUCOMTR-MCNC: 201 MG/DL (ref 70–130)
HBA1C MFR BLD: 6.7 %
Lab: ABNORMAL
Lab: NORMAL

## 2023-05-24 NOTE — PROGRESS NOTES
.  Kash Mullen 28 y.o.  CC: Diabetes (Type 1, F/U; Last OV: 2/7/23)      [place]: Diabetes (Type 1, F/U; Last OV: 2/7/23)    Blood sugar and 90 day average sugar reviewed  Results for orders placed or performed in visit on 05/24/23   POC Glycosylated Hemoglobin (Hb A1C)    Specimen: Blood   Result Value Ref Range    Hemoglobin A1C 6.7 %    Lot Number [id number]     Expiration Date 1-31-25    POC Glucose, Blood    Specimen: Blood   Result Value Ref Range    Glucose 201 (A) 70 - 130 mg/dL    Lot Number [id number]     Expiration Date 11-25-23      Is utd with eye exam  No neuropathy  bp is good  a[facility] higher vs last check 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 ([facility])    No Known Allergies    Current Outpatient Medications:   •  Alcohol Swabs (Alcohol Prep) pads, Use 8-10 per day to test blood sugars and administer insulin, Disp: 200 each, Rfl: 10  •  cetirizine (zyrTEC) 5 MG tablet, Take 1 tablet by mouth Daily As Needed for Allergies., Disp: , Rfl:   •  Continuous Blood Gluc Sensor (Dexcom G6 Sensor), CHANGE SENSOR EVERY 10 DAYS, Disp: 3 each, Rfl: 5  •  Continuous Blood Gluc Transmit (Dexcom G6 Transmitter) misc, 1 Units Every 3 (Three) Months., Disp: 1 each, Rfl: 3  •  fluticasone (FLONASE) 50 MCG/ACT nasal spray, 2 sprays into the nostril(s) as directed by provider Daily., Disp: , Rfl:   •  glucagon (GLUCAGEN) 1 MG injection, Inject 1 mg under the skin into the appropriate area as directed See Admin Instructions. Follow package directions for low blood sugar., Disp: 1 kit, Rfl: 1  •  glucose blood (OneTouch Verio) test strip, Use for testing ac and hs 4 times daily, Disp: 150 each, Rfl: 5  •  Insulin Disposable Pump (Omnipod 5  G6 Pod, Gen 5,) misc, 1 Units Every 72 (Seventy-Two) Hours., Disp: 10 each, Rfl: 5  •  NovoLOG 100 UNIT/ML injection, INJECT 60 UNITS VIA PUMP DAILY, Disp: 60 mL, Rfl: 1  •  OneTouch Delica Lancets 33G misc, 1 Units 4 (Four) Times a Day., Disp: 200 each, Rfl: 5  Patient Active Problem List    Diagnosis    • Tear of MCL (medial collateral ligament) of knee, right, initial encounter [S83.411A]    • Tear of PCL (posterior cruciate ligament) of knee, right, initial encounter [S83.521A]    • Type 1 diabetes mellitus without complication [E10.9]      Review of Systems   Constitutional: Negative for activity change, appetite change and unexpected weight change.   HENT: Negative for congestion and rhinorrhea.    Eyes: Negative for visual disturbance.   Respiratory: Negative for cough and shortness of breath.    Cardiovascular: Negative for palpitations and leg swelling.   Gastrointestinal: Negative for constipation, diarrhea and nausea.   Genitourinary: Negative for hematuria.   Musculoskeletal: Positive for arthralgias and gait problem. Negative for back pain, joint swelling and myalgias.        Right acl injury now in brace    Skin: Negative for color change, rash and wound.   Allergic/Immunologic: Negative for environmental allergies, food allergies and immunocompromised state.   Neurological: Negative for dizziness, weakness and light-headedness.   Psychiatric/Behavioral: Negative for confusion, decreased concentration, dysphoric mood and sleep disturbance. The patient is not nervous/anxious.      Social History     Socioeconomic History   • Marital status:    Tobacco Use   • Smoking status: Never     Passive exposure: Never   • Smokeless tobacco: Never   Vaping Use   • Vaping Use: Never used   Substance and Sexual Activity   • Alcohol use: Yes     Alcohol/week: 4.0 standard drinks     Types: 3 Cans of beer, 1 Shots of liquor per week   • Drug use: Never   • Sexual activity: Yes     Partners: Female     Birth  "control/protection: Condom     Family History   Problem Relation Age of Onset   • Hyperlipidemia Mother    • Thyroid disease Mother         Hypothyroidism   • Heart disease Father    • Hypertension Father    • Diabetes Maternal Grandfather      /78 (BP Location: Right arm, Patient Position: Sitting, Cuff Size: Adult)   Pulse 82   Resp 18   Ht 198.1 cm (78\")   Wt 96.6 kg (213 lb)   SpO2 97%   BMI 24.61 kg/m²   Physical Exam  Vitals and nursing note reviewed.   Constitutional:       Appearance: Normal appearance. He is well-developed.   HENT:      Head: Normocephalic and atraumatic.   Eyes:      General: Lids are normal.      Extraocular Movements: Extraocular movements intact.      Conjunctiva/sclera: Conjunctivae normal.      Pupils: Pupils are equal, round, and reactive to light.   Neck:      Thyroid: No thyroid mass or thyromegaly.      Vascular: No carotid bruit.      Trachea: Trachea normal. No tracheal deviation.   Cardiovascular:      Rate and Rhythm: Normal rate and regular rhythm.      Pulses: Normal pulses.      Heart sounds: Normal heart sounds. No murmur heard.    No friction rub. No gallop.   Pulmonary:      Effort: Pulmonary effort is normal. No respiratory distress.      Breath sounds: Normal breath sounds. No wheezing.   Musculoskeletal:         General: No deformity. Normal range of motion.      Cervical back: Normal range of motion and neck supple.   Lymphadenopathy:      Cervical: No cervical adenopathy.   Skin:     General: Skin is warm and dry.      Findings: No erythema or rash.      Nails: There is no clubbing.   Neurological:      General: No focal deficit present.      Mental Status: He is alert and oriented to person, place, and time.      Cranial Nerves: No cranial nerve deficit.      Gait: Gait abnormal.      Deep Tendon Reflexes: Reflexes are normal and symmetric. Reflexes normal.   Psychiatric:         Speech: Speech normal.         Behavior: Behavior normal.         Thought " Content: Thought content normal.         Judgment: Judgment normal.       Results for orders placed or performed in visit on 05/24/23   POC Glycosylated Hemoglobin (Hb A1C)    Specimen: Blood   Result Value Ref Range    Hemoglobin A1C 6.7 %    Lot Number [id number]     Expiration Date 1-31-25    POC Glucose, Blood    Specimen: Blood   Result Value Ref Range    Glucose 201 (A) 70 - 130 mg/dL    Lot Number [id number]     Expiration Date 11-25-23      Diagnoses and all orders for this visit:    1. Type 1 diabetes mellitus without complication (Primary)  Assessment & Plan:  Higher a1c likely multifactorial - higher pp sugars and has been less active with right knee injury  Also about 1 year from dx- likely moving out of [facility] period   Check c peptide NOV   utd with preventive care and a1c overall good   Results for orders placed or performed in visit on 05/24/23   POC Glycosylated Hemoglobin (Hb A1C)    Specimen: Blood   Result Value Ref Range    Hemoglobin A1C 6.7 %    Lot Number [id number]     Expiration Date 1-31-25    POC Glucose, Blood    Specimen: Blood   Result Value Ref Range    Glucose 201 (A) 70 - 130 mg/dL    Lot Number [id number]     Expiration Date 11-25-23      Doing well with OP5 closed loop - continue refills and monitoring   Consider reducing correction/carb if higher sugars continue to be an issue  3 month f/u     Orders:  -     POC Glycosylated Hemoglobin (Hb A1C)  -     POC Glucose, Blood  Return in about 4 months (around 9/24/2023) for Recheck.    Sara Todd MD  Signed Sara Todd MD

## 2023-05-25 NOTE — ASSESSMENT & PLAN NOTE
Higher a1c likely multifactorial - higher pp sugars and has been less active with right knee injury  Also about 1 year from dx- likely moving out of [facility] period   Check c peptide NOV   utd with preventive care and a1c overall good   Results for orders placed or performed in visit on 05/24/23   POC Glycosylated Hemoglobin (Hb A1C)    Specimen: Blood   Result Value Ref Range    Hemoglobin A1C 6.7 %    Lot Number [id number]     Expiration Date 1-31-25    POC Glucose, Blood    Specimen: Blood   Result Value Ref Range    Glucose 201 (A) 70 - 130 mg/dL    Lot Number [id number]     Expiration Date 11-25-23      Doing well with OP5 closed loop - continue refills and monitoring   Consider reducing correction/carb if higher sugars continue to be an issue  3 month f/u

## 2023-06-05 RX ORDER — PROCHLORPERAZINE 25 MG/1
SUPPOSITORY RECTAL
Qty: 3 EACH | Refills: 5 | Status: SHIPPED | OUTPATIENT
Start: 2023-06-05

## 2023-06-05 NOTE — TELEPHONE ENCOUNTER
Caller: Kash Mullen    Relationship: Self    Best call back number: [phone]    Requested Prescriptions:   Requested Prescriptions     Pending Prescriptions Disp Refills    Continuous Blood Gluc Sensor (Dexcom G6 Sensor) 3 each 5        Pharmacy where request should be sent:   [facility]    Last office visit with prescribing clinician: 5/24/2023   Last telemedicine visit with prescribing clinician: Visit date not found   Next office visit with prescribing clinician: 9/26/2023     Additional details provided by patient: CURRENT SENSOR EXPIRES WED 6/7/23    Does the patient have less than a 3 day supply:  [x] Yes  [] No    Would you like a call back once the refill request has been completed: [x] Yes [] No    If the office needs to give you a call back, can they leave a voicemail: [x] Yes [] No    Paulette Goins Rep   06/05/23 13:48 EDT

## 2023-08-11 ENCOUNTER — TELEPHONE (OUTPATIENT)
Dept: ENDOCRINOLOGY | Facility: CLINIC | Age: 28
End: 2023-08-11
Payer: COMMERCIAL

## 2023-08-11 DIAGNOSIS — E10.9 TYPE 1 DIABETES MELLITUS WITHOUT COMPLICATION: Primary | ICD-10-CM

## 2023-08-11 RX ORDER — PROCHLORPERAZINE 25 MG/1
SUPPOSITORY RECTAL
Qty: 3 EACH | Refills: 5 | Status: SHIPPED | OUTPATIENT
Start: 2023-08-11

## 2023-08-11 RX ORDER — PROCHLORPERAZINE 25 MG/1
1 SUPPOSITORY RECTAL
Qty: 1 EACH | Refills: 3 | Status: SHIPPED | OUTPATIENT
Start: 2023-08-11

## 2023-08-11 RX ORDER — INSULIN ASPART 100 [IU]/ML
60 INJECTION, SOLUTION INTRAVENOUS; SUBCUTANEOUS DAILY
Qty: 60 ML | Refills: 1 | Status: SHIPPED | OUTPATIENT
Start: 2023-08-11

## 2023-08-14 ENCOUNTER — TELEPHONE (OUTPATIENT)
Dept: ENDOCRINOLOGY | Facility: CLINIC | Age: 28
End: 2023-08-14
Payer: COMMERCIAL

## 2023-08-14 NOTE — TELEPHONE ENCOUNTER
LMOM to advise pt the referral was created and to call back in one week if he has not been notified and/or he needs any refills before he is seen there

## 2023-08-14 NOTE — TELEPHONE ENCOUNTER
LMOM to advise the pt the refills were sent to the pharmacy on Friday and to call back if the pharmacy does not have them

## 2023-08-14 NOTE — TELEPHONE ENCOUNTER
Referral created- please have him let me know if issues or if he needs us to send refills in interim  Thanks, johnny

## 2023-08-14 NOTE — TELEPHONE ENCOUNTER
Caller: GabrielmarquisKash    Relationship: Self    Best call back number: [phone]    Requested Prescriptions: Continuous Blood Gluc Transmit (Dexcom G6 Transmitter) misc   Continuous Blood Gluc Sensor (Dexcom G6 Sensor)  Insulin Aspart (NovoLOG) 100 UNIT/ML injection    Requested Prescriptions      No prescriptions requested or ordered in this encounter        Pharmacy where request should be sent:  [facility] - [place], [place] - [id number] - [phone]  - [phone] FX  [id number], [facility] [address]  Phone: [phone]  Fax: [phone]      Last office visit with prescribing clinician: 5/24/2023   Last telemedicine visit with prescribing clinician: Visit date not found   Next office visit with prescribing clinician: 9/26/2023     Additional details provided by patient: HE CALLED FRIDAY TO GET MEDICATIONS CALLED INTO HIS PHARMACY    Does the patient have less than a 3 day supply:  [x] Yes  [] No    Would you like a call back once the refill request has been completed: [x] Yes [] No    If the office needs to give you a call back, can they leave a voicemail: [x] Yes [] No    Paulette Wilson Rep   08/14/23 13:38 EDT

## 2023-08-18 ENCOUNTER — TELEPHONE (OUTPATIENT)
Dept: ENDOCRINOLOGY | Facility: CLINIC | Age: 28
End: 2023-08-18
Payer: COMMERCIAL

## 2023-08-18 RX ORDER — INSULIN PMP CART,AUT,G6/7,CNTR
1 EACH SUBCUTANEOUS
Qty: 10 EACH | Refills: 5 | Status: SHIPPED | OUTPATIENT
Start: 2023-08-18

## 2023-08-18 RX ORDER — PROCHLORPERAZINE 25 MG/1
SUPPOSITORY RECTAL
Qty: 3 EACH | Refills: 5 | Status: SHIPPED | OUTPATIENT
Start: 2023-08-18

## 2023-08-18 RX ORDER — PROCHLORPERAZINE 25 MG/1
1 SUPPOSITORY RECTAL
Qty: 1 EACH | Refills: 3 | Status: SHIPPED | OUTPATIENT
Start: 2023-08-18

## 2023-08-18 RX ORDER — INSULIN ASPART 100 [IU]/ML
60 INJECTION, SOLUTION INTRAVENOUS; SUBCUTANEOUS DAILY
Qty: 60 ML | Refills: 1 | Status: SHIPPED | OUTPATIENT
Start: 2023-08-18

## 2023-08-18 NOTE — TELEPHONE ENCOUNTER
Caller: Kash Mullen    Relationship: Self    Best call back number: [phone]    Requested Prescriptions:      Insulin Disposable Pump (Omnipod 5 G6 Pod, Gen 5,) misc      Continuous Blood Gluc Transmit (Dexcom G6 Transmitter) misc      Continuous Blood Gluc Sensor (Dexcom G6 Sensor)      Insulin Aspart (NovoLOG) 100 UNIT/ML injection     Pharmacy where request should be sent: [id number] - [phone]  - [phone] FX     Last office visit with prescribing clinician: 5/24/2023     Next office visit with prescribing clinician: 9/26/2023     Additional details provided by patient:  PATIENT IS ON HIS LAST POD AND URGENTLY NEEDS REFILLS.    Does the patient have less than a 3 day supply:  [x] Yes  [] No    Would you like a call back once the refill request has been completed: [x] Yes [] No    If the office needs to give you a call back, can they leave a voicemail: [x] Yes [] No    Paulette Beckford Rep   08/18/23 15:11 EDT

## 2023-08-25 ENCOUNTER — PRIOR AUTHORIZATION (OUTPATIENT)
Dept: ENDOCRINOLOGY | Facility: CLINIC | Age: 28
End: 2023-08-25
Payer: COMMERCIAL

## 2023-08-25 ENCOUNTER — TELEPHONE (OUTPATIENT)
Dept: ENDOCRINOLOGY | Facility: CLINIC | Age: 28
End: 2023-08-25
Payer: COMMERCIAL

## 2023-08-25 NOTE — TELEPHONE ENCOUNTER
Was called by patients wife after hours that they needed a prior auth for his dexcom supplied and were leaving the country tomorrow. .  I told her I really could not do anything about that on call since that required special forms to which I did not have access. She promptly hung up on me.   I checked his chart and found that the pa had already been submitted by [employer] earlier today. I had the exchange call her to let her now that

## 2023-08-28 NOTE — TELEPHONE ENCOUNTER
PA response received from [employer] regarding dexom G6 sensor.  It was determined that a prior authorization is not required at this time because it is indicated that the member has other insurance and it needs to be billed through the other insurance.    Questionnaire was received from [employer] regarding the dexcom G6 Transmitter  Form will be completed and faxed back to [employer]

## 2023-08-28 NOTE — TELEPHONE ENCOUNTER
The PA for the transmitter will not be covered because it was filled on 6-21-23 and is not due to be refilled.    I resubmitted the dexcom sensors to his new primary insurance - [employer] and the response was:    PO does not manage PA for this patient. Please contact the number on the back of the members card for further assistance    I then called the [facility] and spoke with Rodney and advised her the uiu PA states he has another insurance and the [employer] states he has another insurance so not sure what to do.  She called the [employer] employment office and was told  the patient now has [employer] employee insurance which is an HMO plan so the dexcom sensors and transmitter have to go through DME and they prefer the pt to use [facility] or [facility].    Dr Todd is leave the patient a message with above information and advised him to let us know which company he prefers to use

## 2023-08-29 ENCOUNTER — TELEPHONE (OUTPATIENT)
Dept: ENDOCRINOLOGY | Facility: CLINIC | Age: 28
End: 2023-08-29

## 2023-08-29 NOTE — TELEPHONE ENCOUNTER
PATIENT IS CALLING BACK ABOUT PRIOR AUTH WITH DEXCOM AND MAYBE HAVING TO CHANGE PLACES TO GET THE DEXCOM DUE TO INSURANACE ISSUES. HE WOULD LIKE A CALL BACK TO DISCUSS ISSUES. PHONE NUMBER [phone]

## 2023-08-29 NOTE — TELEPHONE ENCOUNTER
Patient was advised with all details regarding the PA for Dexcom G6 sensors and transmitter  I advised  him to call Rodney at  pharmacy to discuss the pending balance he paid for the sensors he picked up on Friday  Also advised him we will send rx to [facility] for the dexcom and omnipod pods and advised him to call [facility] to set up up an account

## 2024-02-14 RX ORDER — INSULIN PMP CART,AUT,G6/7,CNTR
EACH SUBCUTANEOUS
Qty: 10 EACH | OUTPATIENT
Start: 2024-02-14

## 2024-09-05 RX ORDER — INSULIN ASPART 100 [IU]/ML
INJECTION, SOLUTION INTRAVENOUS; SUBCUTANEOUS
Qty: 30 ML | Status: CANCELLED | OUTPATIENT
Start: 2024-09-05

## 2024-09-05 NOTE — TELEPHONE ENCOUNTER
Rx Refill Note  Requested Prescriptions     Pending Prescriptions Disp Refills    Insulin Aspart (novoLOG) 100 UNIT/ML injection [Pharmacy Med Name: INSULIN ASPART 100 UNIT/ML VL] 30 mL      Sig: INJECT 60 UNITS SUBCUTANEOUSLY ONCE A DAY      Last office visit with prescribing clinician: 5/24/2023   Next office visit with prescribing clinician: Visit date not found                           Jody Marie MA  09/05/24, 10:06 EDT

## 2024-09-06 RX ORDER — INSULIN LISPRO 100 [IU]/ML
60 INJECTION, SOLUTION INTRAVENOUS; SUBCUTANEOUS DAILY
Qty: 30 ML | Refills: 4 | Status: SHIPPED | OUTPATIENT
Start: 2024-09-06

## (undated) DEVICE — THE ECHELON FLEX POWERED PLUS ARTICULATING ENDOSCOPIC LINEAR CUTTERS ARE STERILE, SINGLE PATIENT USE INSTRUMENTS THAT SIMULTANEOUSLYCUT AND STAPLE TISSUE. THERE ARE SIX STAGGERED ROWS OF STAPLES, THREE ON EITHER SIDE OF THE CUT LINE. THE ECHELON FLEX 45 POWERED PLUSINSTRUMENTS HAVE A STAPLE LINE THAT IS APPROXIMATELY 45 MM LONG AND A CUT LINE THAT IS APPROXIMATELY 42 MM LONG. THE SHAFT CAN ROTATE FREELYIN BOTH DIRECTIONS AND AN ARTICULATION MECHANISM ENABLES THE DISTAL PORTION OF THE SHAFT TO PIVOT TO FACILITATE LATERAL ACCESS TO THE OPERATIVESITE.THE INSTRUMENTS ARE PACKAGED WITH A PRIMARY LITHIUM BATTERY PACK THAT MUST BE INSTALLED PRIOR TO USE. THERE ARE SPECIFIC REQUIREMENTS FORDISPOSING OF THE BATTERY PACK. REFER TO THE BATTERY PACK DISPOSAL SECTION.THE INSTRUMENTS ARE PACKAGED WITHOUT A RELOAD AND MUST BE LOADED PRIOR TO USE. A STAPLE RETAINING CAP ON THE RELOAD PROTECTS THE STAPLE LEGPOINTS DURING SHIPPING AND TRANSPORTATION. THE INSTRUMENTS’ LOCK-OUT FEATURE IS DESIGNED TO PREVENT A USED OR IMPROPERLY INSTALLED RELOADFROM BEING REFIRED OR AN INSTRUMENT FROM BEING FIRED WITHOUT A RELOAD.: Brand: ECHELON FLEX

## (undated) DEVICE — PK LAP LASR CHOLE 10

## (undated) DEVICE — ENDOPATH XCEL UNIVERSAL TROCAR STABLILITY SLEEVES: Brand: ENDOPATH XCEL

## (undated) DEVICE — ENDOPOUCH RETRIEVER SPECIMEN RETRIEVAL BAGS: Brand: ENDOPOUCH RETRIEVER

## (undated) DEVICE — SUT VIC 0 UR6 27IN VCP603H

## (undated) DEVICE — APPL CHLORAPREP W/TINT 26ML BLU

## (undated) DEVICE — SUT MNCRYL PLS ANTIB UD 4/0 PS2 18IN

## (undated) DEVICE — ECHELON FLEX  POWERED VASCULAR STAPLER WITH ADVANCED PLACEMENT TIP, 35MM: Brand: ECHELON FLEX

## (undated) DEVICE — COVER,LIGHT HANDLE,FLX,1/PK: Brand: MEDLINE INDUSTRIES, INC.

## (undated) DEVICE — [HIGH FLOW INSUFFLATOR,  DO NOT USE IF PACKAGE IS DAMAGED,  KEEP DRY,  KEEP AWAY FROM SUNLIGHT,  PROTECT FROM HEAT AND RADIOACTIVE SOURCES.]: Brand: PNEUMOSURE

## (undated) DEVICE — INTENDED FOR TISSUE SEPARATION, AND OTHER PROCEDURES THAT REQUIRE A SHARP SURGICAL BLADE TO PUNCTURE OR CUT.: Brand: BARD-PARKER ® STAINLESS STEEL BLADES

## (undated) DEVICE — GLV SURG PREMIERPRO MIC LTX PF SZ8 BRN

## (undated) DEVICE — ENDOPATH XCEL BLUNT TIP TROCARS WITH SMOOTH SLEEVES: Brand: ENDOPATH XCEL

## (undated) DEVICE — SCOPE WARMER THAT PRE-WARMS MULTIPLE LAPAROSCOPES.: Brand: JOSNOE MEDICAL INC

## (undated) DEVICE — MINI ENDOCUT SCISSOR TIP, DISPOSABLE: Brand: RENEW

## (undated) DEVICE — VISUALIZATION SYSTEM: Brand: CLEARIFY

## (undated) DEVICE — GLV SURG PREMIERPRO MIC LTX PF SZ7.5 BRN

## (undated) DEVICE — ENDOPATH XCEL BLADELESS TROCARS WITH STABILITY SLEEVES: Brand: ENDOPATH XCEL